# Patient Record
Sex: FEMALE | Race: WHITE | URBAN - METROPOLITAN AREA
[De-identification: names, ages, dates, MRNs, and addresses within clinical notes are randomized per-mention and may not be internally consistent; named-entity substitution may affect disease eponyms.]

---

## 2021-10-18 ENCOUNTER — HOSPITAL ENCOUNTER (OUTPATIENT)
Dept: PHYSICAL THERAPY | Age: 61
Setting detail: THERAPIES SERIES
Discharge: HOME | End: 2021-10-18
Payer: COMMERCIAL

## 2021-10-18 PROCEDURE — 97112 NEUROMUSCULAR REEDUCATION: CPT

## 2021-10-18 PROCEDURE — 97163 PT EVAL HIGH COMPLEX 45 MIN: CPT

## 2021-10-18 NOTE — PLAN OF CARE
[place], [facility], [address]  Phone: [phone]   Fax:     [phone]                                                       Physical Therapy Certification    Dear Referring Practitioner: Jayden Graham MD,    We had the pleasure of evaluating the following patient for physical therapy services at [address] [place] [address]. A summary of our findings can be found in the initial assessment below. This includes our plan of care. If you have any questions or concerns regarding these findings, please do not hesitate to contact me at the office phone number checked above. Thank you for the referral.       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   [] Yes, Patient intake triggered C-SSRS Screening      [] C-SSRS Screening completed  [] PCP notified via Epic    SUBJECTIVE: Patient has been dealing with significant Hx of neck and back pain. Pt states that her pain is sharp from her LEs to head. Pain is increased with cold, however the heat makes pain more tolerable.  She sleeps with heating hat on her head to reduce pain, however Pt staes that is feels as if se is being \"scalped\" due to the intensity of her pain. Unable to drive for >20 min before needing to pull over and stretch. Pt previously received PT at [facility] last year. Pt is willing to try anything to decrease her pain and does not want to lose her indep with her mobility. Pt goal of PT is be able to manage pain in order to remain indep. Fear avoidance: I should not do physical activities that (might) make my pain worse   [] True   [x] False     Relevant Medical History:HTN, previous spinal surgery x2, 2 thyroid srugeries  Functional Outcome:   NDI: raw score = 35; dysfunction = 70%  Oswestry: raw score = 35; dysfunction = 75%    Pain Scale: 9/10  Easing factors: muscle relaxor, heat   Provocative factors: walking, standing, sitting     Type: [x]Constant   []Intermittent  []Radiating []Localized []other:     Numbness/Tingling: BLE (L>R)    Occupation/School: disabled      Living Status/Prior Level of Function: Prior to this injury / incident, pt was independent with ADLs and IADLs, Pt rents room from older woman with 1 story with basement and 4 steps with HR to get in. OBJECTIVE:     Palpation: unable to fully palpate due to increase excruciating pain/hyperalgesic response with dermatome testing. Functional Mobility/Transfers: SPC, poor balance    Posture: FHP,     Gait: (include devices/WB status) Pt ambulated with SPC with unsteady gait, decreased stance on R LE with trunk lean to L with R LE swing. Pt required CGA for safety with SPC upon arrival. Trialed ambulation with FWW, which improved balance and decreased trunk lean with gait mechanics.      Dermatomes  Unable to complete full testing due to increased pain with testing Normal Abnormal Comments   Top of head (C1)  x Hyperalgesic response    Posterior occipital region (C2)  x Hyperalgesic response    Side of neck (C3)      Top of shoulder (C4)      Lateral deltoid (C5)      Tip of thumb (C6)      Distal middle finger (C7)      Distal fifth finger (C8)      Medial forearm (T1)      inguinal area (L1)       anterior mid-thigh (L2)      distal ant thigh/med knee (L3)      medial lower leg and foot (L4)      lateral lower leg and foot (L5)      posterior calf (S1)      medial calcaneus (S2)          Myotomes Normal Abnormal Comments   Neck flexion (C1-C2)      Neck sidebending (C3)      Shoulder elevation (C4)      Shoulder abduction (C5)      Elbow flexion/wrist extension (C6)      Elbow extension/wrist flexion (C7)      Thumb abduction (C8)      Finger abduction (T1)      Hip flexion (L1-L2)      Knee extension (L2-L4)      Dorsiflexion (L4-L5)      Great Toe Ext (L5)      Ankle Eversion (S1-S2)      Ankle PF(S1-S2)          ROM  Comments   Cervical Flexion     Cervical Extension          Lumbar Flexion     Lumbar Extension       ROM LEFT RIGHT Comments   Cervical Side Bend      Cervical Rotation      Shoulder Flex      Shoulder Abd      Shoulder ER      Shoulder IR            Lumbar Side Bend      Lumbar Rotation      Hip Flexion      Hip Abd      Hip ER      Hip IR      Hip Extension      Knee Ext      Knee Flex            Hamstring Flex      Piriformis                      Strength  Unable to apply pressure due to increased pain with physical touch LEFT RIGHT Comments   Hip Flexors 3 3    Hip Abductors 3 3    Hip Extensors 3 3    Hip Internal Rotators 3 3    Hip External Rotators 3 3     3 3    Knee flex 3 3    Knee ext 3 3    PF 3 3    DF 3- (able to lift to neutral) 3            Cervical Joint mobility: significantly limited due to pain and previous cervical surgery   []Normal    [x]Hypo   []Hyper      Neural dynamic tension testing Normal Abnormal Comments   ULTT            Slump Test  - Degree of knee flexion:  x  Increased symptoms with trunk fwd lean                           [x] Patient history, allergies, meds reviewed. Medical chart reviewed. See intake form.      Review Of Systems (ROS):  [x]Performed Review of systems (Integumentary, CardioPulmonary, Neurological) by intake and observation. Intake form has been scanned into medical record. Patient has been instructed to contact their primary care physician regarding ROS issues if not already being addressed at this time.       Co-morbidities/Complexities (which will affect course of rehabilitation):   []None        []Hx of COVID   Arthritic conditions   []Rheumatoid arthritis (M05.9)  []Osteoarthritis (M19.91)  []Gout   Cardiovascular conditions   [x]Hypertension (I10)  []Hyperlipidemia (E78.5)  []Angina pectoris (I20)  []Atherosclerosis (I70)  []Pacemaker  []Hx of CABG/stent/  cardiac surgeries   Musculoskeletal conditions   []Disc pathology   []Congenital spine pathologies   []Osteoporosis (M81.8)  []Osteopenia (M85.8)  []Scoliosis       Endocrine conditions   []Hypothyroid (E03.9)  []Hyperthyroid Gastrointestinal conditions   []Constipation (J63.26)   Metabolic conditions   []Morbid obesity (E66.01)  []Diabetes type 1(E10.65) or 2 (E11.65)   []Neuropathy (G60.9)     Cardio/Pulmonary conditions   []Asthma (J45)  []Coughing   []COPD (J44.9)  []CHF  []A-fib   Psychological Disorders  [x]Anxiety (F41.9)  [x]Depression (F32.9)   []Other:   Developmental Disorders  []Autism (F84.0)  []CP (G80)  []Down Syndrome (Q90.9)  []Developmental delay     Neurological conditions  []Prior Stroke (I69.30)  []Parkinson's (G20)  []Encephalopathy (G93.40)  []MS (G35)  []Post-polio (G14)  []SCI  []TBI  []ALS Other conditions  []Fibromyalgia (M79.7)  []Vertigo  []Syncope  []Kidney Failure  [x]Cancer      []currently undergoing                treatment  []Pregnancy  []Incontinence   Prior surgeries  []involved limb  [x]previous spinal surgery  [x] section birth  []hysterectomy  []bowel / bladder surgery  [x]other relevant surgeries (2 different neck surgeries)   []Other:              Barriers to/and or personal factors that will affect rehab potential: [x]Age  [x]Sex   []Smoker              []Motivation/Lack of Motivation                        [x]Co-Morbidities              []Cognitive Function, education/learning barriers              [x]Environmental, home barriers              []profession/work barriers  [x]past PT/medical experience  []other:  Justification: pt is significantly limited due to co-morbidities and previous experience with PT at another facility. Falls Risk Assessment (30 days):   [x] Falls Risk assessed and no intervention required. [] Falls Risk assessed and Patient requires intervention due to being higher risk   TUG score (>12s at risk):     [] Falls education provided, including         ASSESSMENT: Pt presented with increase pain in head, neck, shoulders, low back, and BLEs. She ambulate with SPC with very unsteady gait. Practiced using and instructed Pt to use FWW due to increased stability and safety with ambulation. Pt had to move in order to slightly alleviate pain while sitting through evaluation. When testing dermatomes, Pt expressed increased pain with light touch on head, and was unable to tolerate further testing on UEs and LEs. Pt presented with hyperalgesic response to light pressure, however when distracted Pt was able to tolerate light touch. Increased swelling on B LE noted. Discussed seeing PCP and pain management MD to further address increased pain and neurological s/s. Pt recommended for aquatic therapy to decrease pain and perform strengthening exercises.       Functional Impairments:     [x]Noted cervical/thoracic/lumbar/GHJ/proximal hip hypomobility   []Noted cervical/thoracic/lumbosacral and/or generalized hypermobility   []Decreased cervical/UE and/or lumbosacral/hip/LE functional ROM   []Noted Headache pain aggravated by neck movements with/without dizziness   [x]Abnormal reflexes/sensation/myotomal/dermatomal deficits   []Decreased DCF control or ability to hold head up   []Decreased core/proximal hip strength and neuromuscular control    []Decreased RC/scapular/core strength and neuromuscular control    [x]Decreased UE and/or LE functional strength  [x]Reduced balance/proprioceptive control    []other:      Functional Activity Limitations (from functional questionnaire and intake)   [x]Reduced ability to tolerate prolonged functional positions   [x]Reduced ability or difficulty with changes of positions or transfers between positions   [x]Reduced ability to maintain good posture and demonstrate good body mechanics with sitting, bending, and lifting   [x] Reduced ability or tolerance with driving and/or computer work   [x]Reduced ability to perform lifting, reaching, carrying tasks   [x]Reduced ability to concentrate   [x]Reduced ability to sleep    [x]Reduced ability to tolerate any impact through UE or spine   [x]Reduced ability to ambulate prolonged functional periods/distances/surfaces   [x]Reduced ability to squat   [x]Reduced ability to forward bend   [x]Reduced ability to ascend/descend stairs   []other:    Participation Restrictions   [x]Reduced participation in self care activities   [x]Reduced participation in home management activities   []Reduced participation in work activities   []Reduced participation in social activities. []Reduced participation in sport/recreational activities. Classification/Subgrouping:   []Signs/symptoms consistent with spinal instability/stabilization subgroup. []Signs/symptoms consistent with spinal mobilization/manipulation subgroup, myotomes and dermatomes intact. Meets manipulation criteria.     [x]signs/symptoms consistent with neck pain with mobility deficits     []signs/symptoms consistent with neck pain with movement coordinated impairments    [x]signs/symptoms consistent with neck pain with radiating pain    []signs/symptoms consistent with neck pain with headaches (cervicogenic)    [x]Signs/symptoms consistent with nerve root involvement including myotome & dermatome dysfunction   []sign/symptoms consistent with spinal facet dysfunction   []signs/symptoms consistent suggesting central cord compression/UMN syndromes   []Signs/symptoms consistent with Lumbar direction specific/centralization subgroup   []Signs/symptoms consistent with Cervical and/or Lumbar traction subgroup   []signs/symptoms consistent with discogenic cervical pain   []signs/symptoms consistent with rib dysfunction   []signs/symptoms consistent with postural dysfunction   []Signs/symptoms consistent with lumbar stenosis type dysfunction   []signs/symptoms consistent with shoulder pathology    []signs/symptoms consistent with post-surgical status including decreased ROM, strength and function.    []signs/symptoms consistent with pathology which may benefit from Dry Needling   []signs/symptoms which may limit the use of advanced manual therapy techniques: (Hypertension, recent trauma, intolerance to end range positions, prior TIA, visual issues, UE myotomes loss )     Prognosis/Rehab Potential:      []Excellent   []Good    [x]Fair   []Poor    Tolerance of evaluation/treatment:    []Excellent   []Good    [x]Fair   []Poor    Physical Therapy Evaluation Complexity Justification  [x] A history of present problem with:  [] no personal factors and/or comorbidities that impact the plan of care;  []1-2 personal factors and/or comorbidities that impact the plan of care  [x]3 personal factors and/or comorbidities that impact the plan of care  [x] An examination of body systems using standardized tests and measures addressing any of the following: body structures and functions (impairments), activity limitations, and/or participation restrictions;:  [] a total of 1-2 or more elements   [] a total of 3 or more elements   [x] a total of 4 or more elements   [x] A clinical presentation with:  [] stable and/or uncomplicated characteristics   [] evolving clinical presentation with changing characteristics  [x] unstable [] Met: [] Not Met: [] Adjusted  2. Patient will demonstrate increased [facility] cervical/thoracic spine and good LS mobility, good hip ROM to allow for proper joint functioning as indicated by patients Functional Deficits. [] Progressing: [] Met: [] Not Met: [] Adjusted  3. Patient will demonstrate an increase in postural awareness and control and activation of deep cervical stabilizers to allow for proper functional mobility as indicated by patients Functional Deficits. [] Progressing: [] Met: [] Not Met: [] Adjusted  4. Patient will demonstrate an increase in Strength to good proximal hip and core activation to allow for proper functional mobility as indicated by patients Functional Deficits. [] Progressing: [] Met: [] Not Met: [] Adjusted  5. Patient will return to functional activities including waling without increased symptoms or restriction. [] Progressing: [] Met: [] Not Met: [] Adjusted  6. Pt will safely ambulate with FWW in order to go grocery shopping.      [] Progressing: [] Met: [] Not Met: [] Adjusted     Electronically signed by:  Quentin Nevarez, PT, DPT

## 2021-10-18 NOTE — FLOWSHEET NOTE
Manual Intervention (17705)                                                       AquaticTherapy Dates of Service:   Aquatic Visits Exercises/Activities:   Transfers:  [] Stairs   [] Ramp  [] Chair Lift   % Immersion:            Ambulation/ Warm up:   UE Exercises: Forward   x Shoulder Shrugs  x    Lateral   x Shoulder Circles      Retro   x Scapular Retraction      [place]    Push Downs      Heel/Toe Walking    Punching       Rowing       Elbow Flex/Ext       Shldr Flex/Ext       [employer] aBd/aDd    LE Exercises:  Shldr Horiz aBd/aDd    HR/TR x Shldr IR/ER    Marches x Arm Circles    Squats  x PNF Diagonals    Hamstring Curls x Wall Push Ups    Hip Flexion (SLR) x     Hip aBduction (SLR) x     Hip Extension (SLR) x      Hip aDduction (SLR) x     Hip Circles  Functional:    Hip IR/Er  Step up forward x   Hip Hikes  Step up lateral       Step down        Lunges Forward      Lunges Retro      Lunges Lateral     Balance:        SLS        Tandem Stance x      NBOS eyes open x Seated:     NBOS eyes closed  Ankle pumps     Hand to Opposite Knee  Ankle Circles     Fwd Step ups to SLS  Knee Flex/Ext    Lateral Step ups to SLS  Hip aBd/aDd    Stop/Go Gait   Bicycle       Ankle DF/PF      Ankle Inv/Ev    Stretching:       Gastroc/Soleus      Hamstring  x Deep Water:    Knee Flex Stretch  Jog    Piriformis   Noodle Hang    Hip Flexor  Traction at [address]    SKTC      DKTC       ITB  Cool Down:    Quad  Fwd Walking    Mid Back   Lat Walking    UT  Retro Walking    Post Shoulder  Noodle float    Ladder Pull      Pec Stretch            Core: Other:    TrA set      Pelvic Tilts      Multifidi Walk outs c paddle      PNF Chop/Lifts                          Aquatic Abbreviation Key  B= Belt DB= Dumbells T= Theratube   H= Hydrotone N= Noodles W= Weights   P= Paddles S= Speedo equipment K= Kickboard      Pt.  Education: Gave pt tour of pool, explained how to use lockers, what to wear, that it would be in group setting, and showed pt aquatic equipment. Modalities:     Pt. Education:  -patient educated on diagnosis, prognosis and expectations for rehab  -all patient questions were answered    Home Exercise Program:  Discussed need for ambulation with FWW     Therapeutic Exercise and NMR EXR  [] (12717) Provided verbal/tactile cueing for activities related to strengthening, flexibility, endurance, ROM for improvements in  [] LE / Lumbar: LE, proximal hip, and core control with self care, mobility, lifting, ambulation. [] UE / Cervical: cervical, postural, scapular, scapulothoracic and UE control with self care, reaching, carrying, lifting, house/yardwork, driving, computer work.  [] (21158) Provided verbal/tactile cueing for activities related to improving balance, coordination, kinesthetic sense, posture, motor skill, proprioception to assist with   [] LE / lumbar: LE, proximal hip, and core control in self care, mobility, lifting, ambulation and eccentric single leg control. [] UE / cervical: cervical, scapular, scapulothoracic and UE control with self care, reaching, carrying, lifting, house/yardwork, driving, computer work.   [] (06920) Therapist is in constant attendance of 2 or more patients providing skilled therapy interventions, but not providing any significant amount of measurable one-on-one time to either patient, for improvements in  [] LE / lumbar: LE, proximal hip, and core control in self care, mobility, lifting, ambulation and eccentric single leg control. [] UE / cervical: cervical, scapular, scapulothoracic and UE control with self care, reaching, carrying, lifting, house/yardwork, driving, computer work.      NMR and Therapeutic Activities:    [] (92391 or 87232) Provided verbal/tactile cueing for activities related to improving balance, coordination, kinesthetic sense, posture, motor skill, proprioception and motor activation to allow for proper function of   [] LE: / Lumbar core, proximal hip and LE with self care and ADLs  [] UE / Cervical: cervical, postural, scapular, scapulothoracic and UE control with self care, carrying, lifting, driving, computer work.   [] (40142) Gait Re-education- Provided training and instruction to the patient for proper LE, core and proximal hip recruitment and positioning and eccentric body weight control with ambulation re-education including up and down stairs     Home Management Training / Self Care:  [] (88874) Provided self-care/home management training related to activities of daily living and compensatory training, and/or use of adaptive equipment for improvement with: ADLs and compensatory training, meal preparation, safety procedures and instruction in use of adaptive equipment, including bathing, grooming, dressing, personal hygiene, basic household cleaning and chores.      Home Exercise Program:    [] (63573) Reviewed/Progressed HEP activities related to strengthening, flexibility, endurance, ROM of   [] LE / Lumbar: core, proximal hip and LE for functional self-care, mobility, lifting and ambulation/stair navigation   [] UE / Cervical: cervical, postural, scapular, scapulothoracic and UE control with self care, reaching, carrying, lifting, house/yardwork, driving, computer work  [x] (79219)Reviewed/Progressed HEP activities related to improving balance, coordination, kinesthetic sense, posture, motor skill, proprioception of   [x] LE: core, proximal hip and LE for self care, mobility, lifting, and ambulation/stair navigation    [] UE / Cervical: cervical, postural,  scapular, scapulothoracic and UE control with self care, reaching, carrying, lifting, house/yardwork, driving, computer work    Manual Treatments:  PROM / STM / Oscillations-Mobs:  G-I, II, III, IV (PA's, Inf., Post.)  [] (75842) Provided manual therapy to mobilize LE, proximal hip and/or LS spine soft tissue/joints for the purpose of modulating pain, promoting relaxation,  increasing ROM, reducing/eliminating soft tissue demonstrate increased AROM to [facility] of cervical/thoracic spine and good LS mobility, good hip ROM to allow for proper joint functioning as indicated by patients Functional Deficits. [] Progressing: [] Met: [] Not Met: [] Adjusted  3. Patient will demonstrate an increase in postural awareness and control and activation of deep cervical stabilizers to allow for proper functional mobility as indicated by patients Functional Deficits. [] Progressing: [] Met: [] Not Met: [] Adjusted  4. Patient will demonstrate an increase in Strength to good proximal hip and core activation to allow for proper functional mobility as indicated by patients Functional Deficits. [] Progressing: [] Met: [] Not Met: [] Adjusted  5. Patient will return to functional activities including waling without increased symptoms or restriction. [] Progressing: [] Met: [] Not Met: [] Adjusted  6. Pt will safely ambulate with FWW in order to go grocery shopping. [] Progressing: [] Met: [] Not Met: [] Adjusted     Overall Progression Towards Functional goals/ Treatment Progress Update:  [] Patient is progressing as expected towards functional goals listed. [] Progression is slowed due to complexities/Impairments listed. [] Progression has been slowed due to co-morbidities.   [x] Plan just implemented, too soon to assess goals progression <30days   [] Goals require adjustment due to lack of progress  [] Patient is not progressing as expected and requires additional follow up with physician  [] Other    Persisting Functional Limitations/Impairments:  [x]Sleeping [x]Sitting               [x]Standing [x]Transfers        [x]Walking [x]Kneeling               [x]Stairs [x]Squatting / bending   [x]ADLs [x]Reaching  [x]Lifting  [x]Housework  [x]Driving []Job related tasks  []Sports/Recreation []Other:        ASSESSMENT:  See eval  Treatment/Activity Tolerance:  [] Patient able to complete tx [] Patient limited by fatigue  [x] Patient limited by pain  [] Patient limited by other medical complications  [] Other:     Prognosis: [] Good [x] Fair  [] Poor    Patient Requires Follow-up: [x] Yes  [] No    Plan for next treatment session:  2PLAN: See eval. PT 2x / week for 8 weeks. [] Continue per plan of care [] Alter current plan (see comments)  [x] Plan of care initiated [] Hold pending MD visit [] Discharge    Electronically signed by: Carolyn Davidson PT, DPT    Note: If patient does not return for scheduled/ recommended follow up visits, this note will serve as a discharge from care along with most recent update on progress.

## 2021-11-03 ENCOUNTER — APPOINTMENT (OUTPATIENT)
Dept: PHYSICAL THERAPY | Age: 61
End: 2021-11-03
Payer: COMMERCIAL

## 2021-11-05 ENCOUNTER — HOSPITAL ENCOUNTER (OUTPATIENT)
Dept: PHYSICAL THERAPY | Age: 61
Setting detail: THERAPIES SERIES
Discharge: HOME | End: 2021-11-05
Payer: COMMERCIAL

## 2021-11-05 PROCEDURE — 97113 AQUATIC THERAPY/EXERCISES: CPT

## 2021-11-05 PROCEDURE — 97150 GROUP THERAPEUTIC PROCEDURES: CPT

## 2021-11-05 NOTE — FLOWSHEET NOTE
Weights   P= Paddles S= Speedo equipment K= Kickboard      Pt. Education: Gave pt tour of pool, explained how to use lockers, what to wear, that it would be in group setting, and showed pt aquatic equipment. Modalities:     Pt. Education:  -patient educated on diagnosis, prognosis and expectations for rehab  -all patient questions were answered    Home Exercise Program:  Discussed need for ambulation with FWW     Therapeutic Exercise and NMR EXR  [] (86981) Provided verbal/tactile cueing for activities related to strengthening, flexibility, endurance, ROM for improvements in  [] LE / Lumbar: LE, proximal hip, and core control with self care, mobility, lifting, ambulation. [] UE / Cervical: cervical, postural, scapular, scapulothoracic and UE control with self care, reaching, carrying, lifting, house/yardwork, driving, computer work.  [] (08145) Provided verbal/tactile cueing for activities related to improving balance, coordination, kinesthetic sense, posture, motor skill, proprioception to assist with   [] LE / lumbar: LE, proximal hip, and core control in self care, mobility, lifting, ambulation and eccentric single leg control. [] UE / cervical: cervical, scapular, scapulothoracic and UE control with self care, reaching, carrying, lifting, house/yardwork, driving, computer work.   [] (75429) Therapist is in constant attendance of 2 or more patients providing skilled therapy interventions, but not providing any significant amount of measurable one-on-one time to either patient, for improvements in  [] LE / lumbar: LE, proximal hip, and core control in self care, mobility, lifting, ambulation and eccentric single leg control. [] UE / cervical: cervical, scapular, scapulothoracic and UE control with self care, reaching, carrying, lifting, house/yardwork, driving, computer work.      NMR and Therapeutic Activities:    [] (19899 or ) Provided verbal/tactile cueing for activities related to improving balance, coordination, kinesthetic sense, posture, motor skill, proprioception and motor activation to allow for proper function of   [] LE: / Lumbar core, proximal hip and LE with self care and ADLs  [] UE / Cervical: cervical, postural, scapular, scapulothoracic and UE control with self care, carrying, lifting, driving, computer work.   [] (01630) Gait Re-education- Provided training and instruction to the patient for proper LE, core and proximal hip recruitment and positioning and eccentric body weight control with ambulation re-education including up and down stairs     Home Management Training / Self Care:  [] (64766) Provided self-care/home management training related to activities of daily living and compensatory training, and/or use of adaptive equipment for improvement with: ADLs and compensatory training, meal preparation, safety procedures and instruction in use of adaptive equipment, including bathing, grooming, dressing, personal hygiene, basic household cleaning and chores.      Home Exercise Program:    [] (12058) Reviewed/Progressed HEP activities related to strengthening, flexibility, endurance, ROM of   [] LE / Lumbar: core, proximal hip and LE for functional self-care, mobility, lifting and ambulation/stair navigation   [] UE / Cervical: cervical, postural, scapular, scapulothoracic and UE control with self care, reaching, carrying, lifting, house/yardwork, driving, computer work  [x] (71903)Reviewed/Progressed HEP activities related to improving balance, coordination, kinesthetic sense, posture, motor skill, proprioception of   [x] LE: core, proximal hip and LE for self care, mobility, lifting, and ambulation/stair navigation    [] UE / Cervical: cervical, postural,  scapular, scapulothoracic and UE control with self care, reaching, carrying, lifting, house/yardwork, driving, computer work    Manual Treatments:  PROM / STM / Oscillations-Mobs:  G-I, II, III, IV (PA's, Inf., Post.)  [] (30020) Provided manual therapy to mobilize LE, proximal hip and/or LS spine soft tissue/joints for the purpose of modulating pain, promoting relaxation,  increasing ROM, reducing/eliminating soft tissue swelling/inflammation/restriction, improving soft tissue extensibility and allowing for proper ROM for normal function with   [] LE / lumbar: self care, mobility, lifting and ambulation. [] UE / Cervical: self care, reaching, carrying, lifting, house/yardwork, driving, computer work. Modalities:  [] (14085) Vasopneumatic compression: Utilized vasopneumatic compression to decrease edema / swelling for the purpose of improving mobility and quad tone / recruitment which will allow for increased overall function including but not limited to self-care, transfers, ambulation, and ascending / descending stairs. Charges:  Timed Code Treatment Minutes: 15   Total Treatment Minutes: 35     [] EVAL - LOW (52165)   [] EVAL - MOD (07528)  [] EVAL - HIGH (73570)  [] RE-EVAL (12770)  [] JR(75630) x       [] Ionto  [] NMR (54685) x 1       [] Vaso  [] Manual (89456) x       [] Ultrasound  [] TA x        [] Mech Traction (61512)  [x] Aquatic Therapy x   1  [] ES (un) (82312):   [] Home Management Training x  [] ES(attended) (06891)   [] Dry Needling 1-2 muscles (41095):  [] Dry Needling 3+ muscles (771026)  [x] Group:      [] Other:     GOALS:   Patient stated goal: Pt will be independent with knowing various positions to decrease her pain. [] Progressing: [] Met: [] Not Met: [] Adjusted    Therapist goals for Patient:   Short Term Goals: To be achieved in: 2 weeks  1. Independent in HEP and progression per patient tolerance, in order to prevent re-injury. [] Progressing: [] Met: [] Not Met: [] Adjusted  2. Patient will have a decrease in pain to facilitate improvement in movement, function, and ADLs as indicated by Functional Deficits. [] Progressing: [] Met: [] Not Met: [] Adjusted    Long Term Goals:  To be achieved in: 8 weeks  1. Disability index score of 65% or less for the NDI and/or KEITH to assist with reaching prior level of function. [] Progressing: [] Met: [] Not Met: [] Adjusted  2. Patient will demonstrate increased AROM to [facility] of cervical/thoracic spine and good LS mobility, good hip ROM to allow for proper joint functioning as indicated by patients Functional Deficits. [] Progressing: [] Met: [] Not Met: [] Adjusted  3. Patient will demonstrate an increase in postural awareness and control and activation of deep cervical stabilizers to allow for proper functional mobility as indicated by patients Functional Deficits. [] Progressing: [] Met: [] Not Met: [] Adjusted  4. Patient will demonstrate an increase in Strength to good proximal hip and core activation to allow for proper functional mobility as indicated by patients Functional Deficits. [] Progressing: [] Met: [] Not Met: [] Adjusted  5. Patient will return to functional activities including waling without increased symptoms or restriction. [] Progressing: [] Met: [] Not Met: [] Adjusted  6. Pt will safely ambulate with FWW in order to go grocery shopping. [] Progressing: [] Met: [] Not Met: [] Adjusted     Overall Progression Towards Functional goals/ Treatment Progress Update:  [] Patient is progressing as expected towards functional goals listed. [] Progression is slowed due to complexities/Impairments listed. [] Progression has been slowed due to co-morbidities.   [x] Plan just implemented, too soon to assess goals progression <30days   [] Goals require adjustment due to lack of progress  [] Patient is not progressing as expected and requires additional follow up with physician  [] Other    Persisting Functional Limitations/Impairments:  [x]Sleeping [x]Sitting               [x]Standing [x]Transfers        [x]Walking [x]Kneeling               [x]Stairs [x]Squatting / bending   [x]ADLs [x]Reaching  [x]Lifting  [x]Housework  [x]Driving []Job related tasks  []Sports/Recreation []Other:        ASSESSMENT:  See eval  Treatment/Activity Tolerance:  [] Patient able to complete tx [] Patient limited by fatigue  [x] Patient limited by pain  [] Patient limited by other medical complications  [] Other:     Prognosis: [] Good [x] Fair  [] Poor    Patient Requires Follow-up: [x] Yes  [] No    Plan for next treatment session:  2PLAN: See eval. PT 2x / week for 8 weeks. [x] Continue per plan of care [] Alter current plan (see comments)  [] Plan of care initiated [] Hold pending MD visit [] Discharge    Electronically signed by: Maria T Garcia, PT, DPT    Note: If patient does not return for scheduled/ recommended follow up visits, this note will serve as a discharge from care along with most recent update on progress.

## 2021-11-08 ENCOUNTER — HOSPITAL ENCOUNTER (OUTPATIENT)
Dept: PHYSICAL THERAPY | Age: 61
Setting detail: THERAPIES SERIES
Discharge: HOME | End: 2021-11-08
Payer: COMMERCIAL

## 2021-11-08 PROCEDURE — 97113 AQUATIC THERAPY/EXERCISES: CPT

## 2021-11-08 NOTE — FLOWSHEET NOTE
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  Treatment Diagnosis: CERVICAL & LUMBAR SPONDYLOSIS W MYOFASCIAL PAIN  Insurance/Certification information:  PT Insurance Information: [employer]  Physician Information:  Referring Practitioner: Lola Jordan MD  Plan of care signed (Y/N): []  Yes [x]  No     Date of Patient follow up with Physician: no current scheduled date     Progress Report: []  Yes  [x]  No     Date Range for reporting period:  Beginning: 10/18/21  Ending:     Progress report due (10 Rx/or 30 days whichever is less): visit #10 or 75/10/25 (date)     Recertification due (POC duration/ or 90 days whichever is less): visit #16 or 22 (date)     Visit # Insurance Allowable Auth required? Date Range   3/18 128 units  [x]  Yes  []  No 10/18/21-21             Latex Allergy:  [x]NO      []YES  Preferred Language for Healthcare:   [x]English       []other:    Functional Scale:        Date assessed:  NDI: raw score = 35; dysfunction = 70%   10/18/21  Oswestry: raw score = 35; dysfunction = 75%  10/18/21    Pain level:  8/10     SUBJECTIVE:  Pt is having pain all over, not as bad as some days but is still persistent.    OBJECTIVE:       RESTRICTIONS/PRECAUTIONS: HTN, hyperalgesic     Exercises/Interventions:      Resistance / level Sets/sec Reps Notes                                                                   Therapeutic Activities (09285)                                          Neuromuscular Re-ed (88018)       Therapeutic Exercises (15227)   Ambulation with FWW        100 ft x 3      Therapeutic rest breaks required   Pt education on importance of FWW, aquatic PT, and tour of aquatics  10 min Manual Intervention (97444)                                                       AquaticTherapy Dates of Service: 11/5, 11/8  Aquatic Visits Exercises/Activities:   Transfers:  [] Stairs   [] Ramp  [] Chair Lift   % Immersion:            Ambulation/ Warm up:   UE Exercises: Forward x1 Shoulder Shrugs  x    Lateral  x1 Shoulder Circles      Retro  x1 Scapular Retraction      [place]    Push Downs      Heel/Toe Walking    Punching       Rowing       Elbow Flex/Ext x10      Shldr Flex/Ext x10      Shldr aBd/aDd x10   LE Exercises:  Shldr Horiz aBd/aDd x10   HR/TR x10 Shldr IR/ER    Marches x10 Arm Circles x5cw/x5ccw   Squats  x10 PNF Diagonals    Hamstring Curls x10 Wall Push Ups    Hip Flexion (SLR) x10     Hip aBduction (SLR) x10     Hip Extension (SLR) x10      Hip aDduction (SLR)      Hip Circles x10 B each way  Functional:    Hip IR/Er  Step up forward x10 B   Hip Hikes  Step up lateral       Step down        Lunges Forward      Lunges Retro      Lunges Lateral     Balance:        SLS        Tandem Stance x10 sec x 4 B       NBOS eyes open 20 sec x 2  Seated:     NBOS eyes closed  Ankle pumps     Hand to Opposite Knee  Ankle Circles     Fwd Step ups to SLS  Knee Flex/Ext    Lateral Step ups to SLS  Hip aBd/aDd    Stop/Go Gait   Bicycle  x20     Ankle DF/PF x20     Ankle Inv/Ev x20   Stretching:       Gastroc/Soleus      Hamstring  x Deep Water:    Knee Flex Stretch  Jog    Piriformis   Noodle Hang   Hip Flexor  Traction at [facility]   SK     DKTC      ITB  Cool Down:   Quad  Fwd Walking   Mid Back   Lat Walking   UT  Retro Walking   Post Shoulder  Noodle float    Ladder Pull      Pec Stretch            Core: Other:    TrA set      Pelvic Tilts      Multifidi Walk outs c paddle      PNF Chop/Lifts                          Aquatic Abbreviation Key  B= Belt DB= Dumbells T= Theratube   H= Hydrotone N= Noodles W= Weights   P= Paddles S= Speedo equipment K= Kickboard      Pt. Education: Gave pt tour of pool, explained how to use lockers, what to wear, that it would be in group setting, and showed pt aquatic equipment. Modalities:     Pt. Education:  -patient educated on diagnosis, prognosis and expectations for rehab  -all patient questions were answered    Home Exercise Program:  Discussed need for ambulation with FWW     Therapeutic Exercise and NMR EXR  [] (91190) Provided verbal/tactile cueing for activities related to strengthening, flexibility, endurance, ROM for improvements in  [] LE / Lumbar: LE, proximal hip, and core control with self care, mobility, lifting, ambulation. [] UE / Cervical: cervical, postural, scapular, scapulothoracic and UE control with self care, reaching, carrying, lifting, house/yardwork, driving, computer work.  [] (89591) Provided verbal/tactile cueing for activities related to improving balance, coordination, kinesthetic sense, posture, motor skill, proprioception to assist with   [] LE / lumbar: LE, proximal hip, and core control in self care, mobility, lifting, ambulation and eccentric single leg control. [] UE / cervical: cervical, scapular, scapulothoracic and UE control with self care, reaching, carrying, lifting, house/yardwork, driving, computer work. [x] (98354) Aquatic therapy with therapeutic exercise. Provided verbal and tactile cueing for activities related to strengthening, flexibility, endurance, ROM for improvements in  [x] LE / lumbar: LE, proximal hip, and core control in self care, mobility, lifting, ambulation and eccentric single leg control.    [x] UE / cervical: cervical, scapular, scapulothoracic and UE control with self care, reaching, carrying, lifting, house/yardwork, driving, computer work.   [] (78381) Therapist is in constant attendance of 2 or more patients providing skilled therapy interventions, but not providing any significant amount of measurable one-on-one time to either patient, for improvements in  [] LE / lumbar: LE, proximal hip, and core control in self care, mobility, lifting, ambulation and eccentric single leg control. [] UE / cervical: cervical, scapular, scapulothoracic and UE control with self care, reaching, carrying, lifting, house/yardwork, driving, computer work. NMR and Therapeutic Activities:    [] (50478 or 12890) Provided verbal/tactile cueing for activities related to improving balance, coordination, kinesthetic sense, posture, motor skill, proprioception and motor activation to allow for proper function of   [] LE: / Lumbar core, proximal hip and LE with self care and ADLs  [] UE / Cervical: cervical, postural, scapular, scapulothoracic and UE control with self care, carrying, lifting, driving, computer work.   [] (00057) Gait Re-education- Provided training and instruction to the patient for proper LE, core and proximal hip recruitment and positioning and eccentric body weight control with ambulation re-education including up and down stairs     Home Management Training / Self Care:  [] (96337) Provided self-care/home management training related to activities of daily living and compensatory training, and/or use of adaptive equipment for improvement with: ADLs and compensatory training, meal preparation, safety procedures and instruction in use of adaptive equipment, including bathing, grooming, dressing, personal hygiene, basic household cleaning and chores.      Home Exercise Program:    [] (50050) Reviewed/Progressed HEP activities related to strengthening, flexibility, endurance, ROM of   [] LE / Lumbar: core, proximal hip and LE for functional self-care, mobility, lifting and ambulation/stair navigation   [] UE / Cervical: cervical, postural, scapular, scapulothoracic and UE control with self care, reaching, carrying, lifting, house/yardwork, driving, computer work  [] (35869)Reviewed/Progressed HEP activities related to improving balance, coordination, kinesthetic sense, posture, motor skill, proprioception of   [] LE: core, proximal hip and LE for self care, mobility, lifting, and ambulation/stair navigation    [] UE / Cervical: cervical, postural,  scapular, scapulothoracic and UE control with self care, reaching, carrying, lifting, house/yardwork, driving, computer work    Manual Treatments:  PROM / STM / Oscillations-Mobs:  G-I, II, III, IV (PA's, Inf., Post.)  [] (61350) Provided manual therapy to mobilize LE, proximal hip and/or LS spine soft tissue/joints for the purpose of modulating pain, promoting relaxation,  increasing ROM, reducing/eliminating soft tissue swelling/inflammation/restriction, improving soft tissue extensibility and allowing for proper ROM for normal function with   [] LE / lumbar: self care, mobility, lifting and ambulation. [] UE / Cervical: self care, reaching, carrying, lifting, house/yardwork, driving, computer work. Modalities:  [] (29425) Vasopneumatic compression: Utilized vasopneumatic compression to decrease edema / swelling for the purpose of improving mobility and quad tone / recruitment which will allow for increased overall function including but not limited to self-care, transfers, ambulation, and ascending / descending stairs. Units approved Units used Date Range   128 6 10/18/21-12/4/21       Charges:  Timed Code Treatment Minutes: 30   Total Treatment Minutes: 30     [] EVAL - LOW (03279)   [] EVAL - MOD (25861)  [] EVAL - HIGH (40957)  [] RE-EVAL (58088)  [] US(49639) x       [] Ionto  [] NMR (28085) x        [] Vaso  [] Manual (24889) x       [] Ultrasound  [] TA x        [] Mech Traction (46727)  [x] Aquatic Therapy x   2              [] ES (un) (57230):   [] Home Management Training x  [] ES(attended) (94500)   [] Dry Needling 1-2 muscles (33247):  [] Dry Needling 3+ muscles (759970)  [] Group:      [] Other:     GOALS:   Patient stated goal: Pt will be independent with knowing various positions to decrease her pain.    [] Progressing: [] Met: [] Not Met: [] Adjusted    Therapist goals for Patient:   Short Term Goals: To be achieved in: 2 weeks  1. Independent in HEP and progression per patient tolerance, in order to prevent re-injury. [] Progressing: [] Met: [] Not Met: [] Adjusted  2. Patient will have a decrease in pain to facilitate improvement in movement, function, and ADLs as indicated by Functional Deficits. [] Progressing: [] Met: [] Not Met: [] Adjusted    Long Term Goals: To be achieved in: 8 weeks  1. Disability index score of 65% or less for the NDI and/or KEITH to assist with reaching prior level of function. [] Progressing: [] Met: [] Not Met: [] Adjusted  2. Patient will demonstrate [facility]/thoracic spine and good LS mobility, good hip ROM to allow for proper joint functioning as indicated by patients Functional Deficits. [] Progressing: [] Met: [] Not Met: [] Adjusted  3. Patient will demonstrate an increase in postural awareness and control and activation of deep cervical stabilizers to allow for proper functional mobility as indicated by patients Functional Deficits. [] Progressing: [] Met: [] Not Met: [] Adjusted  4. Patient will demonstrate an increase in Strength to good proximal hip and core activation to allow for proper functional mobility as indicated by patients Functional Deficits. [] Progressing: [] Met: [] Not Met: [] Adjusted  5. Patient will return to functional activities including waling without increased symptoms or restriction. [] Progressing: [] Met: [] Not Met: [] Adjusted  6. Pt will safely ambulate with FWW in order to go grocery shopping. [] Progressing: [] Met: [] Not Met: [] Adjusted     Overall Progression Towards Functional goals/ Treatment Progress Update:  [] Patient is progressing as expected towards functional goals listed. [] Progression is slowed due to complexities/Impairments listed. [] Progression has been slowed due to co-morbidities.   [x] Plan just implemented, too soon to assess goals progression <30days   [] Goals require adjustment due to lack of progress  [] Patient is not progressing as expected and requires additional follow up with physician  [] Other    Persisting Functional Limitations/Impairments:  [x]Sleeping [x]Sitting               [x]Standing [x]Transfers        [x]Walking [x]Kneeling               [x]Stairs [x]Squatting / bending   [x]ADLs [x]Reaching  [x]Lifting  [x]Housework  [x]Driving []Job related tasks  []Sports/Recreation []Other:        ASSESSMENT:  Pt did well in the pool. Had one instance where R knee buckled while stepping up on box in the pool, but was able to easily recover. Will continue to progress strength, mobility and endurance in future pool sessions. Treatment/Activity Tolerance:  [] Patient able to complete tx [] Patient limited by fatigue  [x] Patient limited by pain  [] Patient limited by other medical complications  [] Other:     Prognosis: [] Good [x] Fair  [] Poor    Patient Requires Follow-up: [x] Yes  [] No    Plan for next treatment session:  Aquatic chart as above      PLAN: See eval. PT 2x / week for 8 weeks. [x] Continue per plan of care [] Alter current plan (see comments)  [] Plan of care initiated [] Hold pending MD visit [] Discharge    Electronically signed by: Bernice Vann PT, DPT    Note: If patient does not return for scheduled/ recommended follow up visits, this note will serve as a discharge from care along with most recent update on progress.

## 2021-11-11 ENCOUNTER — HOSPITAL ENCOUNTER (OUTPATIENT)
Dept: PHYSICAL THERAPY | Age: 61
Setting detail: THERAPIES SERIES
Discharge: HOME | End: 2021-11-11
Payer: COMMERCIAL
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
## 2021-11-15 ENCOUNTER — HOSPITAL ENCOUNTER (OUTPATIENT)
Dept: PHYSICAL THERAPY | Age: 61
Setting detail: THERAPIES SERIES
Discharge: HOME | End: 2021-11-15
Payer: COMMERCIAL

## 2021-11-15 PROCEDURE — 97113 AQUATIC THERAPY/EXERCISES: CPT

## 2021-11-15 NOTE — FLOWSHEET NOTE
[address] 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  Treatment Diagnosis: CERVICAL & LUMBAR SPONDYLOSIS W MYOFASCIAL PAIN  Insurance/Certification information:  PT Insurance Information: [employer]  Physician Information:  Referring Practitioner: Rodrick Marte MD  Plan of care signed (Y/N): []  Yes [x]  No     Date of Patient follow up with Physician: no current scheduled date     Progress Report: []  Yes  [x]  No     Date Range for reporting period:  Beginning: 10/18/21  Ending:     Progress report due (10 Rx/or 30 days whichever is less): visit #10 or  (date)     Recertification due (POC duration/ or 90 days whichever is less): visit #16 or 22 (date)     Visit # Insurance Allowable Auth required? Date Range    128 units  [x]  Yes  []  No 10/18/21-21             Latex Allergy:  [x]NO      []YES  Preferred Language for Healthcare:   [x]English       []other:    Functional Scale:        Date assessed:  NDI: raw score = 35; dysfunction = 70%   10/18/21  Oswestry: raw score = 35; dysfunction = 75%  10/18/21    Pain level:  8/10     SUBJECTIVE:  Pt has been rushing to get here so she's a little shaken up. Pain hasn't caught up with her yet.        OBJECTIVE:       RESTRICTIONS/PRECAUTIONS: HTN, hyperalgesic     Exercises/Interventions:      Resistance / level Sets/sec Reps Notes                                                                   Therapeutic Activities (15244)                                          Neuromuscular Re-ed (42826)       Therapeutic Exercises (25570)   Ambulation with FWW        100 ft x 3      Therapeutic rest breaks required   Pt education on importance of FWW, aquatic PT, and tour of aquatics  10 min                                   Manual Intervention ([id number] Dates of Service: 11/5, 11/8, 11/15  Aquatic Visits Exercises/Activities:   Transfers:  [] Stairs   [] Ramp  [] Chair Lift   % Immersion:            Ambulation/ Warm up:   UE Exercises: Forward x2 Shoulder Shrugs  x    Lateral  x1 Shoulder Circles      Retro  x Scapular Retraction   X10 seated on ledge   [place]    Push Downs      Heel/Toe Walking    Punching       Rowing X10 Back at wall      Elbow Flex/Ext x10 Back at wall      Shldr Flex/Ext x10      Shldr aBd/aDd [id number] at wall   LE Exercises:  Shldr Horiz aBd/aDd    HR/TR x10 Shldr IR/ER    Marches x10 Arm Circles    Squats  x10 PNF Diagonals    Hamstring Curls x10 Wall Push Ups    Hip Flexion (SLR) x10     Hip aBduction (SLR) x10     Hip Extension (SLR) x10      Hip aDduction (SLR)      Hip Circles  Functional:    Hip IR/Er  Step up forward held due to pain  Hip Hikes  Step up lateral       Step down        Lunges Forward      Lunges Retro      Lunges Lateral     Balance:        SLS        Tandem Stance      NBOS eyes open Seated:     NBOS eyes closed  Ankle pumps  X10   Hand to Opposite Knee  Ankle Circles  X10   Fwd Step ups to SLS  Knee Flex/Ext Partial ROM X10 L/R LAQ   Lateral Step ups to SLS  Hip aBd/aDd    Stop/Go Gait   Bicycle       Ankle DF/PF x20     Ankle Inv/Ev x20   Stretching:       Gastroc/Soleus      Hamstring  xseated on bench with slight knee extension 10\" X3 L/R Deep Water:    Knee Flex Stretch  Jog    Piriformis   Noodle Hang   Hip Flexor  Traction at [address]   SKTC     DKTC      ITB  Cool Down:   Quad  Fwd Walking   Mid Back   Lat Walking   UT  Retro Walking   Post Shoulder  Noodle float    Ladder Pull 10\" X2 and then had to stop due to back pain     Pec Stretch            Core:   Other:    TrA set      Pelvic Tilts      Multifidi Walk outs c paddle      PNF Chop/Lifts      Chin tucks to tolerance 5\" X8                   Aquatic Abbreviation Key  B= Belt DB= Dumbells T= Theratube   H= Hydrotone N= Noodles W= Weights   P= Paddles S= Speedo equipment K= Kickboard      Pt. Education: Gave pt tour of pool, explained how to use lockers, what to wear, that it would be in group setting, and showed pt aquatic equipment. Modalities:     Pt. Education:  -patient educated on diagnosis, prognosis and expectations for rehab  -all patient questions were answered    Home Exercise Program:  Discussed need for ambulation with FWW     Therapeutic Exercise and NMR EXR  [] (58268) Provided verbal/tactile cueing for activities related to strengthening, flexibility, endurance, ROM for improvements in  [] LE / Lumbar: LE, proximal hip, and core control with self care, mobility, lifting, ambulation. [] UE / Cervical: cervical, postural, scapular, scapulothoracic and UE control with self care, reaching, carrying, lifting, house/yardwork, driving, computer work.  [] (07961) Provided verbal/tactile cueing for activities related to improving balance, coordination, kinesthetic sense, posture, motor skill, proprioception to assist with   [] LE / lumbar: LE, proximal hip, and core control in self care, mobility, lifting, ambulation and eccentric single leg control. [] UE / cervical: cervical, scapular, scapulothoracic and UE control with self care, reaching, carrying, lifting, house/yardwork, driving, computer work. [x] (72850) Aquatic therapy with therapeutic exercise. Provided verbal and tactile cueing for activities related to strengthening, flexibility, endurance, ROM for improvements in  [x] LE / lumbar: LE, proximal hip, and core control in self care, mobility, lifting, ambulation and eccentric single leg control.    [x] UE / cervical: cervical, scapular, scapulothoracic and UE control with self care, reaching, carrying, lifting, house/yardwork, driving, computer work.   [] (39958) Therapist is in constant attendance of 2 or more patients providing skilled therapy interventions, but not providing any significant amount of measurable one-on-one time to either patient, for improvements in  [] LE / lumbar: LE, proximal hip, and core control in self care, mobility, lifting, ambulation and eccentric single leg control. [] UE / cervical: cervical, scapular, scapulothoracic and UE control with self care, reaching, carrying, lifting, house/yardwork, driving, computer work. NMR and Therapeutic Activities:    [] (16238 or 72042) Provided verbal/tactile cueing for activities related to improving balance, coordination, kinesthetic sense, posture, motor skill, proprioception and motor activation to allow for proper function of   [] LE: / Lumbar core, proximal hip and LE with self care and ADLs  [] UE / Cervical: cervical, postural, scapular, scapulothoracic and UE control with self care, carrying, lifting, driving, computer work.   [] (55894) Gait Re-education- Provided training and instruction to the patient for proper LE, core and proximal hip recruitment and positioning and eccentric body weight control with ambulation re-education including up and down stairs     Home Management Training / Self Care:  [] (81820) Provided self-care/home management training related to activities of daily living and compensatory training, and/or use of adaptive equipment for improvement with: ADLs and compensatory training, meal preparation, safety procedures and instruction in use of adaptive equipment, including bathing, grooming, dressing, personal hygiene, basic household cleaning and chores.      Home Exercise Program:    [] (83946) Reviewed/Progressed HEP activities related to strengthening, flexibility, endurance, ROM of   [] LE / Lumbar: core, proximal hip and LE for functional self-care, mobility, lifting and ambulation/stair navigation   [] UE / Cervical: cervical, postural, scapular, scapulothoracic and UE control with self care, reaching, carrying, lifting, house/yardwork, driving, computer work  [] (30987)Reviewed/Progressed HEP activities related to improving balance, coordination, kinesthetic sense, posture, motor skill, proprioception of   [] LE: core, proximal hip and LE for self care, mobility, lifting, and ambulation/stair navigation    [] UE / Cervical: cervical, postural,  scapular, scapulothoracic and UE control with self care, reaching, carrying, lifting, house/yardwork, driving, computer work    Manual Treatments:  PROM / STM / Oscillations-Mobs:  G-I, II, III, IV (PA's, Inf., Post.)  [] (75320) Provided manual therapy to mobilize LE, proximal hip and/or LS spine soft tissue/joints for the purpose of modulating pain, promoting relaxation,  increasing ROM, reducing/eliminating soft tissue swelling/inflammation/restriction, improving soft tissue extensibility and allowing for proper ROM for normal function with   [] LE / lumbar: self care, mobility, lifting and ambulation. [] UE / Cervical: self care, reaching, carrying, lifting, house/yardwork, driving, computer work. Modalities:  [] (49594) Vasopneumatic compression: Utilized vasopneumatic compression to decrease edema / swelling for the purpose of improving mobility and quad tone / recruitment which will allow for increased overall function including but not limited to self-care, transfers, ambulation, and ascending / descending stairs.        Units approved Units used Date Range   128 6 10/18/21-12/4/21       Charges:  Timed Code Treatment Minutes: 30   Total Treatment Minutes: 30     [] EVAL - LOW (55940)   [] EVAL - MOD (94776)  [] EVAL - HIGH (72842)  [] RE-EVAL (48821)  [] ML(10369) x       [] Ionto  [] NMR (66325) x        [] Vaso  [] Manual (38296) x       [] Ultrasound  [] TA x        [] Mech Traction (19772)  [x] Aquatic Therapy x   2              [] ES (un) (36908):   [] Home Management Training x  [] ES(attended) (34295)   [] Dry Needling 1-2 muscles (38887): [] Dry Needling 3+ muscles (179807)  [] Group:      [] Other:     GOALS:   Patient stated goal: Pt will be independent with knowing various positions to decrease her pain. [] Progressing: [] Met: [] Not Met: [] Adjusted    Therapist goals for Patient:   Short Term Goals: To be achieved in: 2 weeks  1. Independent in HEP and progression per patient tolerance, in order to prevent re-injury. [] Progressing: [] Met: [] Not Met: [] Adjusted  2. Patient will have a decrease in pain to facilitate improvement in movement, function, and ADLs as indicated by Functional Deficits. [] Progressing: [] Met: [] Not Met: [] Adjusted    Long Term Goals: To be achieved in: 8 weeks  1. Disability index score of 65% or less for the NDI and/or KEITH to assist with reaching prior level of function. [] Progressing: [] Met: [] Not Met: [] Adjusted  2. Patient will demonstrate increased AROM to [facility] of cervical/thoracic spine and good LS mobility, good hip ROM to allow for proper joint functioning as indicated by patients Functional Deficits. [] Progressing: [] Met: [] Not Met: [] Adjusted  3. Patient will demonstrate an increase in postural awareness and control and activation of deep cervical stabilizers to allow for proper functional mobility as indicated by patients Functional Deficits. [] Progressing: [] Met: [] Not Met: [] Adjusted  4. Patient will demonstrate an increase in Strength to good proximal hip and core activation to allow for proper functional mobility as indicated by patients Functional Deficits. [] Progressing: [] Met: [] Not Met: [] Adjusted  5. Patient will return to functional activities including waling without increased symptoms or restriction. [] Progressing: [] Met: [] Not Met: [] Adjusted  6. Pt will safely ambulate with FWW in order to go grocery shopping.      [] Progressing: [] Met: [] Not Met: [] Adjusted     Overall Progression Towards Functional goals/ Treatment Progress Update:  [] Patient is progressing as expected towards functional goals listed. [] Progression is slowed due to complexities/Impairments listed. [x] Progression has been slowed due to co-morbidities/previous spine surgeries in neck. [] Plan just implemented, too soon to assess goals progression <30days   [] Goals require adjustment due to lack of progress  [] Patient is not progressing as expected and requires additional follow up with physician  [] Other    Persisting Functional Limitations/Impairments:  [x]Sleeping [x]Sitting               [x]Standing [x]Transfers        [x]Walking [x]Kneeling               [x]Stairs [x]Squatting / bending   [x]ADLs [x]Reaching  [x]Lifting  [x]Housework  [x]Driving []Job related tasks  []Sports/Recreation []Other:        ASSESSMENT:  Pt. Was overwhelmed before she even started PT due to \"rushing around\"  but sat for a few minutes to calm herself. Patient did well walking in pool up to chest but once leg exercises started she started having a \"cramp \" in her L middle leg that then travelled to her upper back. PT modified exercises to decrease ROM and attempted several times to have patient rest.  Patient very tearful with inability to calm herself. PT asked her if she is on any pain meds and she stated no. PT also asked of she is seeing a pain MD and patient states the 2 that were refererrd by Cheryl this summer would not take her due to [employer]. Patient given 3-4 PMR MD and Neurologists to call for pain management this date. Treatment/Activity Tolerance:  [] Patient able to complete tx [] Patient limited by fatigue  [x] Patient limited by pain  [] Patient limited by other medical complications  [] Other:     Prognosis: [] Good [x] Fair  [] Poor    Patient Requires Follow-up: [x] Yes  [] No    Plan for next treatment session:  Aquatic chart as above      PLAN: See eval. PT 2x / week for 8 weeks.    [x] Continue per plan of care [] Alter current plan (see comments)  [] Plan of care initiated [] Hold pending MD visit [] Discharge    Electronically signed by: Kianna Jaimes PT/Yamilex Dhaliwal, [place] #[id number]    Note: If patient does not return for scheduled/ recommended follow up visits, this note will serve as a discharge from care along with most recent update on progress.

## 2021-11-18 ENCOUNTER — HOSPITAL ENCOUNTER (OUTPATIENT)
Dept: PHYSICAL THERAPY | Age: 61
Setting detail: THERAPIES SERIES
Discharge: HOME | End: 2021-11-18
Payer: COMMERCIAL

## 2021-11-18 PROCEDURE — 97113 AQUATIC THERAPY/EXERCISES: CPT

## 2021-11-18 PROCEDURE — 97150 GROUP THERAPEUTIC PROCEDURES: CPT

## 2021-11-18 NOTE — FLOWSHEET NOTE
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  Treatment Diagnosis: CERVICAL & LUMBAR SPONDYLOSIS W MYOFASCIAL PAIN  Insurance/Certification information:  PT [facility] Information[facility]  Physician Information:  Referring Practitioner: Ricarda Santiago MD  Plan of care signed (Y/N): []  Yes [x]  No     Date of Patient follow up with Physician: no current scheduled date     Progress Report: []  Yes  [x]  No     Date Range for reporting period:  Beginning: 10/18/21  Ending:     Progress report due (10 Rx/or 30 days whichever is less): visit #10 or  (date)     Recertification due (POC duration/ or 90 days whichever is less): visit #16 or 22 (date)     Visit # Insurance Allowable Auth required? Date Range    128 units  [x]  Yes  []  No 10/18/21-21             Latex Allergy:  [x]NO      []YES  Preferred Language for Healthcare:   [x]English       []other:    Functional Scale:        Date assessed:  NDI: raw score = 35; dysfunction = 70%   10/18/21  Oswestry: raw score = 35; dysfunction = 75%  10/18/21    Pain level:  8/10     SUBJECTIVE:  Pt reports bad pain after visit. Didn't think she did too much but will try to take it easy.        OBJECTIVE:       RESTRICTIONS/PRECAUTIONS: HTN, hyperalgesic     Exercises/Interventions:      Resistance / level Sets/sec Reps Notes                                                                   Therapeutic Activities (46811)                                          Neuromuscular Re-ed (34444)       Therapeutic Exercises (63594)   Ambulation with FWW        100 ft x 3      Therapeutic rest breaks required   Pt education on importance of FWW, aquatic PT, and tour of aquatics  10 min                                   Manual Intervention ([id number] Dates of Service: 11/5, 11/8, 11/15, 11/18,  Aquatic Visits Exercises/Activities:   Transfers:  [] Stairs   [] Ramp  [] Chair Lift   % Immersion:            Ambulation/ Warm up:   UE Exercises: Forward x2 Shoulder Shrugs  x    Lateral  x1 Shoulder Circles      Retro  x Scapular Retraction   X10 seated on ledge   Cariocas    Push Downs      Heel/Toe Walking    Punching       Rowing X10 Back at wall      Elbow Flex/Ext x10 Back at wall      Shldr Flex/Ext x10      Shldr aBd/aDd q63Bint at wall   LE Exercises:  Shldr Horiz aBd/aDd    HR/TR x10 Shldr IR/ER    Marches x10 Arm Circles    Squats  x10 PNF Diagonals    Hamstring Curls x10 Wall Push Ups    Hip Flexion (SLR) x10     Hip aBduction (SLR) x10     Hip Extension (SLR) x10      Hip aDduction (SLR)      Hip Circles  Functional:    Hip IR/Er  Step up forward held due to pain  Hip Hikes  Step up lateral       Step down        Lunges Forward      Lunges Retro      Lunges Lateral     Balance:        SLS        Tandem Stance      NBOS eyes open Seated:     NBOS eyes closed  Ankle pumps  X10   Hand to Opposite Knee  Ankle Circles  X10   Fwd Step ups to SLS  Knee Flex/Ext Partial ROM X10 L/R LAQ   Lateral Step ups to SLS  Hip aBd/aDd    Stop/Go Gait   Bicycle       Ankle DF/PF x20     Ankle Inv/Ev x20   Stretching:       Gastroc/Soleus      Hamstring  xseated on bench with slight knee extension 10\" X3 L/R [place]:    Knee Flex Stretch  Jog    Piriformis   Noodle Hang   Hip Flexor  Traction at [facility]      ITB  Cool Down:   Quad  Fwd Walking   Mid Back   Lat Walking   UT  Retro Walking   Post Shoulder  Noodle float    Ladder Pull 10\" X2 and then had to stop due to back pain     Pec Stretch            Core:   Other:    TrA set      Pelvic Tilts      Multifidi Walk outs c paddle      PNF Chop/Lifts Chin tucks to tolerance 5\" X8                   Aquatic Abbreviation Key  B= Belt DB= Dumbells T= Theratube   H= Hydrotone N= Noodles W= Weights   P= Paddles S= Speedo equipment K= Kickboard      Pt. Education: Gave pt tour of pool, explained how to use lockers, what to wear, that it would be in group setting, and showed pt aquatic equipment. Modalities:     Pt. Education:  -patient educated on diagnosis, prognosis and expectations for rehab  -all patient questions were answered    Home Exercise Program:  Discussed need for ambulation with FWW     Therapeutic Exercise and NMR EXR  [] (66952) Provided verbal/tactile cueing for activities related to strengthening, flexibility, endurance, ROM for improvements in  [] LE / Lumbar: LE, proximal hip, and core control with self care, mobility, lifting, ambulation. [] UE / Cervical: cervical, postural, scapular, scapulothoracic and UE control with self care, reaching, carrying, lifting, house/yardwork, driving, computer work.  [] (27497) Provided verbal/tactile cueing for activities related to improving balance, coordination, kinesthetic sense, posture, motor skill, proprioception to assist with   [] LE / lumbar: LE, proximal hip, and core control in self care, mobility, lifting, ambulation and eccentric single leg control. [] UE / cervical: cervical, scapular, scapulothoracic and UE control with self care, reaching, carrying, lifting, house/yardwork, driving, computer work. [x] (77125) Aquatic therapy with therapeutic exercise. Provided verbal and tactile cueing for activities related to strengthening, flexibility, endurance, ROM for improvements in  [x] LE / lumbar: LE, proximal hip, and core control in self care, mobility, lifting, ambulation and eccentric single leg control.    [x] UE / cervical: cervical, scapular, scapulothoracic and UE control with self care, reaching, carrying, lifting, house/yardwork, driving, computer work.   [] (15036) Therapist is in constant attendance of 2 or more patients providing skilled therapy interventions, but not providing any significant amount of measurable one-on-one time to either patient, for improvements in  [] LE / lumbar: LE, proximal hip, and core control in self care, mobility, lifting, ambulation and eccentric single leg control. [] UE / cervical: cervical, scapular, scapulothoracic and UE control with self care, reaching, carrying, lifting, house/yardwork, driving, computer work. NMR and Therapeutic Activities:    [] (42657 or 85995) Provided verbal/tactile cueing for activities related to improving balance, coordination, kinesthetic sense, posture, motor skill, proprioception and motor activation to allow for proper function of   [] LE: / Lumbar core, proximal hip and LE with self care and ADLs  [] UE / Cervical: cervical, postural, scapular, scapulothoracic and UE control with self care, carrying, lifting, driving, computer work.   [] (78753) Gait Re-education- Provided training and instruction to the patient for proper LE, core and proximal hip recruitment and positioning and eccentric body weight control with ambulation re-education including up and down stairs     Home Management Training / Self Care:  [] (64203) Provided self-care/home management training related to activities of daily living and compensatory training, and/or use of adaptive equipment for improvement with: ADLs and compensatory training, meal preparation, safety procedures and instruction in use of adaptive equipment, including bathing, grooming, dressing, personal hygiene, basic household cleaning and chores.      Home Exercise Program:    [] (07407) Reviewed/Progressed HEP activities related to strengthening, flexibility, endurance, ROM of   [] LE / Lumbar: core, proximal hip and LE for functional self-care, mobility, lifting and ambulation/stair navigation   [] UE / Cervical: cervical, postural, scapular, scapulothoracic and UE control with self care, reaching, carrying, lifting, house/yardwork, driving, computer work  [] (46261)Reviewed/Progressed HEP activities related to improving balance, coordination, kinesthetic sense, posture, motor skill, proprioception of   [] LE: core, proximal hip and LE for self care, mobility, lifting, and ambulation/stair navigation    [] UE / Cervical: cervical, postural,  scapular, scapulothoracic and UE control with self care, reaching, carrying, lifting, house/yardwork, driving, computer work    Manual Treatments:  PROM / STM / Oscillations-Mobs:  G-I, II, III, IV (PA's, Inf., Post.)  [] (19661) Provided manual therapy to mobilize LE, proximal hip and/or LS spine soft tissue/joints for the purpose of modulating pain, promoting relaxation,  increasing ROM, reducing/eliminating soft tissue swelling/inflammation/restriction, improving soft tissue extensibility and allowing for proper ROM for normal function with   [] LE / lumbar: self care, mobility, lifting and ambulation. [] UE / Cervical: self care, reaching, carrying, lifting, house/yardwork, driving, computer work. Modalities:  [] (61945) Vasopneumatic compression: Utilized vasopneumatic compression to decrease edema / swelling for the purpose of improving mobility and quad tone / recruitment which will allow for increased overall function including but not limited to self-care, transfers, ambulation, and ascending / descending stairs.        Units approved Units used Date Range   128 8 10/18/21-12/4/21       Charges:  Timed Code Treatment Minutes: 12   Total Treatment Minutes: 30     [] EVAL - LOW (28592)   [] EVAL - MOD (37926)  [] EVAL - HIGH (85175)  [] RE-EVAL (37648)  [] QF(91353) x       [] Ionto  [] NMR (16814) x        [] Vaso  [] Manual (17191) x       [] Ultrasound  [] TA x        [] Mech Traction (33365)  [x] Aquatic Therapy x   1              [] ES (un) (80758):   [] Home Management Training x  [] ES(attended) (47574)   [] Dry Needling 1-2 muscles Patient is progressing as expected towards functional goals listed. [] Progression is slowed due to complexities/Impairments listed. [x] Progression has been slowed due to co-morbidities/previous spine surgeries in neck. [] Plan just implemented, too soon to assess goals progression <30days   [] Goals require adjustment due to lack of progress  [] Patient is not progressing as expected and requires additional follow up with physician  [] Other    Persisting Functional Limitations/Impairments:  [x]Sleeping [x]Sitting               [x]Standing [x]Transfers        [x]Walking [x]Kneeling               [x]Stairs [x]Squatting / bending   [x]ADLs [x]Reaching  [x]Lifting  [x]Housework  [x]Driving []Job related tasks  []Sports/Recreation []Other:        ASSESSMENT:  Pt. Was overwhelmed before she even started PT due to \"rushing around\"  but sat for a few minutes to calm herself. Patient did well walking in pool up to chest but once leg exercises started she started having a \"cramp \" in her L middle leg that then travelled to her upper back. PT modified exercises to decrease ROM and attempted several times to have patient rest.  Patient very tearful with inability to calm herself. PT asked her if she is on any pain meds and she stated no. PT also asked of she is seeing a pain MD and patient states the 2 that were refererrd by Cheryl this summer would not take her due to [employer]. Patient given 3-4 PMR MD and Neurologists to call for pain management this date. Treatment/Activity Tolerance:  [] Patient able to complete tx [] Patient limited by fatigue  [x] Patient limited by pain  [] Patient limited by other medical complications  [] Other:     Prognosis: [] Good [x] Fair  [] Poor    Patient Requires Follow-up: [x] Yes  [] No    Plan for next treatment session:  Aquatic chart as above      PLAN: See eval. PT 2x / week for 8 weeks.    [x] Continue per plan of care [] Alter current plan (see comments)  [] Plan of care initiated [] Hold pending MD visit [] Discharge    Electronically signed by: Cristine Pham, PT/Yamilex Dhaliwal, [place] #[id number]    Note: If patient does not return for scheduled/ recommended follow up visits, this note will serve as a discharge from care along with most recent update on progress.

## 2021-11-23 ENCOUNTER — HOSPITAL ENCOUNTER (OUTPATIENT)
Dept: PHYSICAL THERAPY | Age: 61
Setting detail: THERAPIES SERIES
Discharge: HOME | End: 2021-11-23
Payer: COMMERCIAL

## 2021-11-23 PROCEDURE — 97150 GROUP THERAPEUTIC PROCEDURES: CPT

## 2021-11-23 PROCEDURE — 97113 AQUATIC THERAPY/EXERCISES: CPT

## 2021-11-23 NOTE — FLOWSHEET NOTE
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 Date Range    128 units  [x]  Yes  []  No 10/18/21-21             Latex Allergy:  [x]NO      []YES  Preferred Language for Healthcare:   [x]English       []other:    Functional Scale:        Date assessed:  NDI: raw score = 35; dysfunction = 70%   10/18/21  Oswestry: raw score = 35; dysfunction = 75%  10/18/21    Pain level:  8/10     SUBJECTIVE:     Pain levels are elevated today, has been moving around her home more, trying to prepare for Thanksgiving day meal and family coming over. Plans to stick to only 10 reps with each exercise since she's already to tired. Was okay following last session but later that day, everything returned at once.         OBJECTIVE:       RESTRICTIONS/PRECAUTIONS: HTN, hyperalgesic     Exercises/Interventions:      Resistance / level Sets/sec Reps Notes                                                                   Therapeutic Activities (34481) Neuromuscular Re-ed (19373)       Therapeutic Exercises (95387)   Ambulation with FWW        100 ft x 3      Therapeutic rest breaks required   Pt education on importance of FWW, aquatic PT, and tour of aquatics  10 min                                   Manual Intervention (77235)                                                       AquaticTherapy Dates of Service: 11/5, 11/8, 11/15, 11/18, 11/23  Aquatic Visits Exercises/Activities:   Transfers:  [] Stairs   [] Ramp  [] Chair Lift   % Immersion:            Ambulation/ Warm up:   UE Exercises:       Forward x2 Shoulder Shrugs  x    Lateral  x1 Shoulder Circles      Retro  x Scapular Retraction   X10 seated on ledge   [place]ariocas    Push Downs      Heel/Toe Walking    Punching       Rowing X10 Back at wall      Elbow Flex/Ext x10 Back at wall      Shldr Flex/Ext x10      Shldr aBd/aDd a16[place] at wall   LE Exercises:  Shldr Horiz aBd/aDd    HR/TR x10 Shldr IR/ER    Marches x10 Arm Circles    Squats  x10 PNF Diagonals    Hamstring Curls x10 Wall Push Ups    Hip Flexion (SLR) x10     Hip aBduction (SLR) x10     Hip Extension (SLR) x10      Hip aDduction (SLR)      Hip Circles x10 B each way  Functional:    Hip IR/Er  Step up forward held due to pain  Hip Hikes  Step up lateral       Step down        Lunges Forward      Lunges Retro      Lunges Lateral     Balance:        SLS        Tandem Stance x10 sec x 4 B       NBOS eyes open 20 sec x 2  :  Standing 11/23     NBOS eyes closed  Ankle pumps  X10   Hand to Opposite Knee  Ankle Circles  X10   Fwd Step ups to SLS  Knee Flex/Ext Partial ROM X10 L/R LAQ   Lateral Step ups to SLS  Hip aBd/aDd    Stop/Go Gait   Bicycle       Ankle DF/PF x20     Ankle Inv/Ev x20   Stretching:       Gastroc/Soleus      Hamstring  On ladder 30\" X3 L/R [place]:    Knee Flex Stretch  Jog    Piriformis   Noodle Hang   Hip Flexor  Traction at [facility]     DKTC      ITB  Cool Down:   Quad  Fwd Walking   Mid Back   Lat Walking   UT Retro Walking   Post Shoulder  Noodle float    Ladder Pull      Pec Stretch            Core: Other:    TrA set      Pelvic Tilts      Multifidi Walk outs c paddle      PNF Chop/Lifts      Chin tucks to tolerance x10                   Aquatic Abbreviation Key  B= Belt DB= Dumbells T= Theratube   H= Hydrotone N= Noodles W= Weights   P= Paddles S= Speedo equipment K= Kickboard      Pt. Education: Gave pt tour of pool, explained how to use lockers, what to wear, that it would be in group setting, and showed pt aquatic equipment. Modalities:     Pt. Education:  -patient educated on diagnosis, prognosis and expectations for rehab  -all patient questions were answered    Home Exercise Program:  Discussed need for ambulation with FWW     Therapeutic Exercise and NMR EXR  [] (48712) Provided verbal/tactile cueing for activities related to strengthening, flexibility, endurance, ROM for improvements in  [] LE / Lumbar: LE, proximal hip, and core control with self care, mobility, lifting, ambulation. [] UE / Cervical: cervical, postural, scapular, scapulothoracic and UE control with self care, reaching, carrying, lifting, house/yardwork, driving, computer work.  [] (77249) Provided verbal/tactile cueing for activities related to improving balance, coordination, kinesthetic sense, posture, motor skill, proprioception to assist with   [] LE / lumbar: LE, proximal hip, and core control in self care, mobility, lifting, ambulation and eccentric single leg control. [] UE / cervical: cervical, scapular, scapulothoracic and UE control with self care, reaching, carrying, lifting, house/yardwork, driving, computer work. [x] (16133) Aquatic therapy with therapeutic exercise. Provided verbal and tactile cueing for activities related to strengthening, flexibility, endurance, ROM for improvements in  [x] LE / lumbar: LE, proximal hip, and core control in self care, mobility, lifting, ambulation and eccentric single leg control. core, proximal hip and LE for functional self-care, mobility, lifting and ambulation/stair navigation   [] UE / Cervical: cervical, postural, scapular, scapulothoracic and UE control with self care, reaching, carrying, lifting, house/yardwork, driving, computer work  [] (09207)Reviewed/Progressed HEP activities related to improving balance, coordination, kinesthetic sense, posture, motor skill, proprioception of   [] LE: core, proximal hip and LE for self care, mobility, lifting, and ambulation/stair navigation    [] UE / Cervical: cervical, postural,  scapular, scapulothoracic and UE control with self care, reaching, carrying, lifting, house/yardwork, driving, computer work    Manual Treatments:  PROM / STM / Oscillations-Mobs:  G-I, II, III, IV (PA's, Inf., Post.)  [] (50247) Provided manual therapy to mobilize LE, proximal hip and/or LS spine soft tissue/joints for the purpose of modulating pain, promoting relaxation,  increasing ROM, reducing/eliminating soft tissue swelling/inflammation/restriction, improving soft tissue extensibility and allowing for proper ROM for normal function with   [] LE / lumbar: self care, mobility, lifting and ambulation. [] UE / Cervical: self care, reaching, carrying, lifting, house/yardwork, driving, computer work. Modalities:  [] (90350) Vasopneumatic compression: Utilized vasopneumatic compression to decrease edema / swelling for the purpose of improving mobility and quad tone / recruitment which will allow for increased overall function including but not limited to self-care, transfers, ambulation, and ascending / descending stairs.        Units approved Units used Date Range   128 11 10/18/21-12/4/21       Charges:  Timed Code Treatment Minutes: 30   Total Treatment Minutes: 52     [] EVAL - LOW (92710)   [] EVAL - MOD (60708)  [] EVAL - HIGH (23699)  [] RE-EVAL (08892)  [] QU(60477) x       [] Ionto  [] NMR (00719) x        [] Vaso  [] Manual (46239) x       [] Ultrasound  [] TA x        [] [facility] Traction (41255)  [x] Aquatic Therapy x   2              [] ES (un) (17778):   [] Home Management Training x  [] ES(attended) (75602)   [] Dry Needling 1-2 muscles (97794):  [] Dry Needling 3+ muscles (114457)  [x] Group: 1     [] Other:     GOALS:   Patient stated goal: Pt will be independent with knowing various positions to decrease her pain. [] Progressing: [] Met: [] Not Met: [] Adjusted    Therapist goals for Patient:   Short Term Goals: To be achieved in: 2 weeks  1. Independent in HEP and progression per patient tolerance, in order to prevent re-injury. [] Progressing: [] Met: [] Not Met: [] Adjusted  2. Patient will have a decrease in pain to facilitate improvement in movement, function, and ADLs as indicated by Functional Deficits. [] Progressing: [] Met: [] Not Met: [] Adjusted    Long Term Goals: To be achieved in: 8 weeks  1. Disability index score of 65% or less for the NDI and/or KEITH to assist with reaching prior level of function. [] Progressing: [] Met: [] Not Met: [] Adjusted  2. Patient will demonstrate increased AROM to [facility] of cervical/thoracic spine and good LS mobility, good hip ROM to allow for proper joint functioning as indicated by patients Functional Deficits. [] Progressing: [] Met: [] Not Met: [] Adjusted  3. Patient will demonstrate an increase in postural awareness and control and activation of deep cervical stabilizers to allow for proper functional mobility as indicated by patients Functional Deficits. [] Progressing: [] Met: [] Not Met: [] Adjusted  4. Patient will demonstrate an increase in Strength to good proximal hip and core activation to allow for proper functional mobility as indicated by patients Functional Deficits. [] Progressing: [] Met: [] Not Met: [] Adjusted  5. Patient will return to functional activities including waling without increased symptoms or restriction. [] Progressing: [] Met: [] Not Met: [] Adjusted  6.  Pt will safely ambulate with FWW in order to go grocery shopping. [] Progressing: [] Met: [] Not Met: [] Adjusted     Overall Progression Towards Functional goals/ Treatment Progress Update:  [] Patient is progressing as expected towards functional goals listed. [] Progression is slowed due to complexities/Impairments listed. [x] Progression has been slowed due to co-morbidities/previous spine surgeries in neck. [] Plan just implemented, too soon to assess goals progression <30days   [] Goals require adjustment due to lack of progress  [] Patient is not progressing as expected and requires additional follow up with physician  [] Other    Persisting Functional Limitations/Impairments:  [x]Sleeping [x]Sitting               [x]Standing [x]Transfers        [x]Walking [x]Kneeling               [x]Stairs [x]Squatting / bending   [x]ADLs [x]Reaching  [x]Lifting  [x]Housework  [x]Driving []Job related tasks  []Sports/Recreation []Other:        ASSESSMENT:      Increased difficulty performing chin tucks, compensates with platysmas. Tolerated ex's well, expects to have increased fatigue & soreness later on though. Treatment/Activity Tolerance:  [] Patient able to complete tx [] Patient limited by fatigue  [x] Patient limited by pain  [] Patient limited by other medical complications  [] Other:     Prognosis: [] Good [x] Fair  [] Poor    Patient Requires Follow-up: [x] Yes  [] No    Plan for next treatment session:  Aquatic chart as above      PLAN: See jasmyne. PT 2x / week for 8 weeks. [x] Continue per plan of care [] Alter current plan (see comments)  [] Plan of care initiated [] Hold pending MD visit [] Discharge    Electronically signed by: Renell Alpers, PT    Note: If patient does not return for scheduled/ recommended follow up visits, this note will serve as a discharge from care along with most recent update on progress.

## 2021-11-25 ENCOUNTER — APPOINTMENT (OUTPATIENT)
Dept: PHYSICAL THERAPY | Age: 61
End: 2021-11-25
Payer: COMMERCIAL

## 2021-11-26 ENCOUNTER — HOSPITAL ENCOUNTER (OUTPATIENT)
Dept: PHYSICAL THERAPY | Age: 61
Setting detail: THERAPIES SERIES
Discharge: HOME | End: 2021-11-26
Payer: COMMERCIAL
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
## 2021-11-30 ENCOUNTER — HOSPITAL ENCOUNTER (OUTPATIENT)
Dept: PHYSICAL THERAPY | Age: 61
Setting detail: THERAPIES SERIES
Discharge: HOME | End: 2021-11-30
Payer: COMMERCIAL
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
## 2022-05-11 ENCOUNTER — HOSPITAL ENCOUNTER (OUTPATIENT)
Dept: PHYSICAL THERAPY | Age: 62
Setting detail: THERAPIES SERIES
Discharge: HOME | End: 2022-05-11
Payer: COMMERCIAL

## 2022-05-11 NOTE — FLOWSHEET NOTE
Physical Therapy  Cancellation/No-show Note  Patient Name:  Michael Muñoz  :  1960   Date:  2022  Cancelled visits to date: 0  No-shows to date: 1    Patient status for today's appointment patient:  []  Cancelled  []  Rescheduled appointment  [x]  No-show -  (eval)     Reason given by patient:  []  Patient ill  []  Conflicting appointment  []  No transportation    []  Conflict with work  [x]  No reason given  []  Other:     Comments:      Phone call information:   []  Phone call made today to patient at _ time at number provided:      []  Patient answered, conversation as follows:    []  Patient did not answer, message left as follows:  [x]  Phone call not made today  []  Phone call not needed - pt contacted us to cancel and provided reason for cancellation.      Electronically signed by:  Rivas Torres PT

## 2022-05-20 ENCOUNTER — HOSPITAL ENCOUNTER (OUTPATIENT)
Dept: PHYSICAL THERAPY | Age: 62
Setting detail: THERAPIES SERIES
Discharge: HOME | End: 2022-05-20
Payer: COMMERCIAL

## 2022-05-20 PROCEDURE — 97162 PT EVAL MOD COMPLEX 30 MIN: CPT

## 2022-05-20 PROCEDURE — 97530 THERAPEUTIC ACTIVITIES: CPT
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
## 2022-05-20 NOTE — FLOWSHEET NOTE
[address] [facility]  Phone: [phone]   Fax: [phone]    Physical Therapy Treatment Note/ Progress Report:     Date:  2022    Patient Name:  Yogesh Beckham    :  1960  MRN: [id number]  Restrictions/Precautions:    Medical/Treatment Diagnosis Information:  · Diagnosis: Low Back Pain  · Treatment Diagnosis: Back pain - cervical, thoracic and lumbar  Insurance/Certification information:  PT Insurance Information: Jose Brown  Physician Information:   Dr. Singleton Barley of care signed (Y/N): []  Yes [x]  No    Date of Patient follow up with Physician: 22     Progress Report: []  Yes  [x]  No     Date Range for reporting period:  Beginnin22  Ending:     Progress report due (10 Rx/or 30 days whichever is less): visit #10 or   (date)     Recertification due (POC duration/ or 90 days whichever is less): visit #8 or 22 (date)     Visit # Insurance Allowable Auth required?  Date Range     [x]  Yes  []  No        Units approved Units used Date Range          Latex Allergy:  [x]NO      []YES  Preferred Language for Healthcare:   [x]English       []other:    Functional Scale:           Date assessed:  TO physical FS primary measure score = 22; risk adjusted = 38  22    Pain level:  10+/10     SUBJECTIVE:  See eval    OBJECTIVE: See eval   Observation:    Test measurements:      RESTRICTIONS/PRECAUTIONS:     Treatment based classification:    [] mobilization/manipulation   [] stabilization   [] extension based   [] flexion based   [] lateral shift   [] traction   [] unspecified Components:   [x] thoracolumbar   [x] pelvic   [] SIJ   [] sacral   [] hip         Exercises/Interventions:     Therapeutic Exercise (73294) Resistance / level Sets / Seconds Reps Notes / Cues                                                                  Therapeutic Activities (99101)                                          Neuromuscular Re-ed (39528) Manual Intervention (19963)       Prone PA       GISTM/STM       Lumbar Manip       SI Manip       Hip belt mobs       Hip LA distraction                                AquaticTherapy Dates of Service:   Aquatic Visits Exercises/Activities:   Transfers:  [] Stairs   [] Ramp  [] Chair Lift   % Immersion:            Ambulation/ Warm up:   UE Exercises: Forward   x Shoulder Shrugs      Lateral   x Shoulder Circles      Retro   x Scapular Retraction      [place]    Push Downs      Heel/Toe Walking    Punching       Rowing       Elbow Flex/Ext       Shldr Flex/Ext       [employer] aBd/aDd    LE Exercises:  Shldr Horiz aBd/aDd    HR/TR x Shldr IR/ER    Marches x Arm Circles    Squats  x PNF Diagonals    Hamstring Curls x Wall Push Ups    Hip Flexion (SLR) x     Hip aBduction (SLR) x     Hip Extension (SLR) x      Hip aDduction (SLR)      Hip Circles x Functional:    Hip IR/Er  Step up forward    Hip Hikes  Step up lateral       Step down        Lunges Forward      Lunges Retro      Lunges Lateral     Balance:        SLS        Tandem Stance       NBOS eyes open  Seated:     NBOS eyes closed  Ankle pumps     Hand to Opposite Knee  Ankle Circles     Fwd Step ups to SLS  Knee Flex/Ext    Lateral Step ups to SLS  Hip aBd/aDd    Stop/Go Gait   Bicycle       Ankle DF/PF      Ankle Inv/Ev    Stretching:       Gastroc/Soleus x     Hamstring  x Deep Water:    Knee Flex Stretch  Jog    Piriformis  x Noodle Hang    Hip Flexor x Traction at Wall    SKTC x     DKTC       ITB  Cool Down:    Quad  Fwd Walking    Mid Back   Lat Walking    UT  Retro Walking    Post Shoulder  Noodle float    Ladder Pull      Pec Stretch            Core: Other:    TrA set x     Pelvic Tilts x     Multifidi Walk outs c paddle      PNF Chop/Lifts                          Aquatic Abbreviation Key  B= Belt DB= Dumbells T= Theratube   H= Hydrotone N= Noodles W= Weights   P= Paddles S= Speedo equipment K= Kickboard      Pt.  Education: 5/20/22: Gave pt tour of pool, explained how to use lockers, what to wear, that it would be in group setting, and showed pt aquatic equipment. Modalities:   5/20/22: hot blanket wrapped around patient at end of session - pt very tearful and in significant amounts of pain    Pt. Education:  5/20/22:  -pt educated on diagnosis, prognosis and expectations for rehab  -all pt questions were answered    Home Exercise Program:  None given this date    Therapeutic Exercise and NMR EXR  [] (09003) Provided verbal/tactile cueing for activities related to strengthening, flexibility, endurance, ROM  for improvements in proximal hip and core control with self care, mobility, lifting and ambulation.  [] (23129) Provided verbal/tactile cueing for activities related to improving balance, coordination, kinesthetic sense, posture, motor skill, proprioception  to assist with core control in self care, mobility, lifting, and ambulation.   [] (40005) Therapist is in constant attendance of 2 or more patients providing skilled therapy interventions, but not providing any significant amount of measurable one-on-one time to either patient, for improvements in LE, proximal hip, and core control in self care, mobility, lifting, ambulation and eccentric single leg control.      Therapeutic Activities:    [] (30749 or 50564) Provided verbal/tactile cueing for activities related to improving balance, coordination, kinesthetic sense, posture, motor skill, proprioception and motor activation to allow for proper function  with self care and ADLs  [x] (92973) Provided training and instruction to the patient for proper core and proximal hip recruitment and positioning with ambulation re-education     Home Exercise Program:    [x] (88786) Reviewed/Progressed HEP activities related to strengthening, flexibility, endurance, ROM of core, proximal hip and LE for functional self-care, mobility, lifting and ambulation   [] (69253) Reviewed/Progressed HEP activities related to improving balance, coordination, kinesthetic sense, posture, motor skill, proprioception of core, proximal hip and LE for self care, mobility, lifting, and ambulation      Manual Treatments:  PROM / STM / Oscillations-Mobs:  G-I, II, III, IV (Phil, Inf., Post.)  [] (46814) Provided manual therapy to mobilize proximal hip and LS spine soft tissue/joints for the purpose of modulating pain, promoting relaxation,  increasing ROM, reducing/eliminating soft tissue swelling/inflammation/restriction, improving soft tissue extensibility and allowing for proper ROM for normal function with self care, mobility, lifting and ambulation. Charges:  Timed Code Treatment Minutes: 15   Total Treatment Minutes: 45       [] EVAL - LOW (39734)   [x] EVAL - MOD (10898)  [] EVAL - HIGH (98658)  [] RE-EVAL (59164)  [] DA(98217) x      [] Ionto  [] NMR (71090) x      [] Vaso  [] Manual (14008) x      [] Ultrasound  [x] TA x 1      [] Mech Traction (26581)  [] GaitTraining x     [] ES (un) (40390):   [] Home Management Training [] ES(attended) (86797)             [] Aquatics    [] Group  [] Other    Goals:   Patient stated goal: relieve pain  []? Progressing: []? Met: []? Not Met: []? Adjusted     Therapist goals for Patient:   Short Term Goals: To be achieved in: 2 weeks  1. Independent in HEP and progression per patient tolerance, in order to prevent re-injury. []? Progressing: []? Met: []? Not Met: []? Adjusted  2. Patient will have a decrease in pain to facilitate improvement in movement, function, and ADLs as indicated by ability to drive to therapy without increased pain  []? Progressing: []? Met: []? Not Met: []? Adjusted     Long Term Goals: To be achieved in: 4 weeks  1. FOTO score of at least 36 to assist with reaching prior level of function. []? Progressing: []? Met: []? Not Met: []? Adjusted  2.  Patient will demonstrate increased AROM to WNL, good LS mobility, good hip ROM to allow pt to pick items up off floor   []? Progressing: []? Met: []? Not Met: []? Adjusted  3. Patient will demonstrate an increase in Strength to good proximal hip and core activation to allow for proper functional mobility as indicated by patients ability to walk >10 minutes without increased symptoms/restrictions  []? Progressing: []? Met: []? Not Met: []? Adjusted  2. Patient will return to functional activities including standing to cook for 10 minutes without increased symptoms or restriction. []? Progressing: []? Met: []? Not Met: []? Adjusted    Overall Progression Towards Functional goals/ Treatment Progress Update:  [] Patient is progressing as expected towards functional goals listed. [] Progression is slowed due to complexities/Impairments listed. [] Progression has been slowed due to co-morbidities. [x] Plan just implemented, too soon to assess goals progression <30days   [] Goals require adjustment due to lack of progress  [] Patient is not progressing as expected and requires additional follow up with physician  [] Other    Persisting Functional Limitations/Impairments:  [x]Sitting [x]Standing   [x]Walking [x]Squatting/bending    [x]Stairs [x]ADL's    [x]Transfers [x]Reaching  [x]Housework []Job related tasks  [x]Driving []Sports/Recreation   [x]Sleeping []Other:    ASSESSMENT:  See eval  Treatment/Activity Tolerance:  [] Patient able to complete tx  [] Patient limited by fatique  [x] Patient limited by pain  [] Patient limited by other medical complications  [] Other:     Prognosis: [] Good [x] Fair  [] Poor    Patient Requires Follow-up: [x] Yes  [] No    PLAN: See jasmyne. PT 2 x / week for 4 weeks.    [] Continue per plan of care [] Alter current plan (see comments)  [x] Plan of care initiated [] Hold pending MD visit [] Discharge    Electronically signed by: Ivan Addison, PT PT, DPT OCS [id number]      Note: If patient does not return for scheduled/ recommended follow up visits, this note will serve as

## 2024-07-09 ENCOUNTER — HOSPITAL ENCOUNTER (EMERGENCY)
Age: 64
Discharge: HOME | End: 2024-07-09
Attending: EMERGENCY MEDICINE
Payer: COMMERCIAL

## 2024-07-09 ENCOUNTER — APPOINTMENT (OUTPATIENT)
Age: 64
End: 2024-07-09
Payer: COMMERCIAL

## 2024-07-09 VITALS
SYSTOLIC BLOOD PRESSURE: 173 MMHG | HEART RATE: 87 BPM | WEIGHT: 245.3 LBS | TEMPERATURE: 98.5 F | RESPIRATION RATE: 16 BRPM | BODY MASS INDEX: 41.46 KG/M2 | OXYGEN SATURATION: 98 % | DIASTOLIC BLOOD PRESSURE: 88 MMHG

## 2024-07-09 DIAGNOSIS — R51.9 NONINTRACTABLE EPISODIC HEADACHE, UNSPECIFIED HEADACHE TYPE: Primary | ICD-10-CM

## 2024-07-09 LAB
ALBUMIN SERPL-MCNC: 3.9 G/DL (ref 3.4–5)
ALBUMIN/GLOB SERPL: 1.1 {RATIO}
ALP SERPL-CCNC: 88 U/L (ref 40–129)
ALT SERPL-CCNC: 20 U/L (ref 10–40)
ANION GAP SERPL CALCULATED.3IONS-SCNC: 15 MMOL/L (ref 3–16)
AST SERPL-CCNC: 22 U/L (ref 15–37)
BACTERIA URNS QL MICRO: ABNORMAL
BASOPHILS # BLD: 0.04 K/UL (ref 0–0.2)
BASOPHILS NFR BLD: 1 %
BILIRUB SERPL-MCNC: <0.2 MG/DL (ref 0–1)
BILIRUB UR QL STRIP: NEGATIVE
BUN SERPL-MCNC: 16 MG/DL (ref 7–20)
CALCIUM SERPL-MCNC: 8.6 MG/DL (ref 8.3–10.6)
CHARACTER UR: ABNORMAL
CHLORIDE SERPL-SCNC: 106 MMOL/L (ref 99–110)
CLARITY UR: CLEAR
CO2 SERPL-SCNC: 21 MMOL/L (ref 21–32)
COLOR UR: YELLOW
CREAT SERPL-MCNC: 0.8 MG/DL (ref 0.6–1.2)
CRP SERPL HS-MCNC: 3.2 MG/L (ref 0–5.1)
EOSINOPHIL # BLD: 0.1 K/UL (ref 0–0.6)
EOSINOPHILS RELATIVE PERCENT: 1 %
EPI CELLS #/AREA URNS HPF: ABNORMAL /HPF
ERYTHROCYTE [DISTWIDTH] IN BLOOD BY AUTOMATED COUNT: 15 % (ref 12.4–15.4)
ERYTHROCYTE [SEDIMENTATION RATE] IN BLOOD BY WESTERGREN METHOD: 15 MM/HR (ref 0–30)
GFR, ESTIMATED: 78 ML/MIN/1.73M2
GLUCOSE BLD-MCNC: 117 MG/DL (ref 70–99)
GLUCOSE SERPL-MCNC: 135 MG/DL (ref 70–99)
GLUCOSE UR STRIP-MCNC: NEGATIVE MG/DL
HCT VFR BLD AUTO: 41.1 % (ref 36–48)
HGB BLD-MCNC: 13.7 G/DL (ref 12–16)
HGB UR QL STRIP.AUTO: ABNORMAL
IMM GRANULOCYTES # BLD AUTO: 0.1 K/UL (ref 0–0.5)
IMM GRANULOCYTES NFR BLD: 1 %
KETONES UR STRIP-MCNC: ABNORMAL MG/DL
LEUKOCYTE ESTERASE UR QL STRIP: NEGATIVE
LIPASE SERPL-CCNC: 14 U/L (ref 13–60)
LYMPHOCYTES NFR BLD: 1.26 K/UL (ref 1–5.1)
LYMPHOCYTES RELATIVE PERCENT: 15 %
MAGNESIUM SERPL-MCNC: 2.2 MG/DL (ref 1.8–2.4)
MCH RBC QN AUTO: 28.1 PG (ref 26–34)
MCHC RBC AUTO-ENTMCNC: 33.3 G/DL (ref 31–36)
MCV RBC AUTO: 84.2 FL (ref 80–100)
MONOCYTES NFR BLD: 0.43 K/UL (ref 0–1.3)
MONOCYTES NFR BLD: 5 %
NEUTROPHILS NFR BLD: 78 %
NEUTS SEG NFR BLD: 6.68 K/UL (ref 1.7–7.7)
NITRITE UR QL STRIP: NEGATIVE
PH UR STRIP: 5.5 [PH] (ref 5–8)
PLATELET # BLD AUTO: 392 K/UL (ref 135–450)
PMV BLD AUTO: 9.6 FL
POTASSIUM SERPL-SCNC: 4.1 MMOL/L (ref 3.5–5.1)
PROT SERPL-MCNC: 7.2 G/DL (ref 6.4–8.2)
PROT UR STRIP-MCNC: 100 MG/DL
RBC # BLD AUTO: 4.88 M/UL (ref 4–5.2)
RBC #/AREA URNS HPF: ABNORMAL /HPF
SARS-COV-2 RDRP RESP QL NAA+PROBE: NOT DETECTED
SODIUM SERPL-SCNC: 141 MMOL/L (ref 136–145)
SP GR UR STRIP: >1.03 (ref 1–1.03)
SPECIMEN DESCRIPTION: NORMAL
UROBILINOGEN UR STRIP-ACNC: 0.2 EU/DL (ref 0–1)
WBC #/AREA URNS HPF: ABNORMAL /HPF
WBC OTHER # BLD: 8.6 K/UL (ref 4–11)
YEAST URNS QL MICRO: PRESENT

## 2024-07-09 PROCEDURE — 99284 EMERGENCY DEPT VISIT MOD MDM: CPT

## 2024-07-09 PROCEDURE — 6360000002 HC RX W HCPCS: Performed by: EMERGENCY MEDICINE

## 2024-07-09 PROCEDURE — 83690 ASSAY OF LIPASE: CPT

## 2024-07-09 PROCEDURE — 81001 URINALYSIS AUTO W/SCOPE: CPT

## 2024-07-09 PROCEDURE — 82962 GLUCOSE BLOOD TEST: CPT

## 2024-07-09 PROCEDURE — 70450 CT HEAD/BRAIN W/O DYE: CPT

## 2024-07-09 PROCEDURE — 85652 RBC SED RATE AUTOMATED: CPT

## 2024-07-09 PROCEDURE — 96375 TX/PRO/DX INJ NEW DRUG ADDON: CPT

## 2024-07-09 PROCEDURE — 80053 COMPREHEN METABOLIC PANEL: CPT

## 2024-07-09 PROCEDURE — 85025 COMPLETE CBC W/AUTO DIFF WBC: CPT

## 2024-07-09 PROCEDURE — 83735 ASSAY OF MAGNESIUM: CPT

## 2024-07-09 PROCEDURE — 93005 ELECTROCARDIOGRAM TRACING: CPT | Performed by: EMERGENCY MEDICINE

## 2024-07-09 PROCEDURE — 87635 SARS-COV-2 COVID-19 AMP PRB: CPT

## 2024-07-09 PROCEDURE — 96374 THER/PROPH/DIAG INJ IV PUSH: CPT

## 2024-07-09 PROCEDURE — 86140 C-REACTIVE PROTEIN: CPT

## 2024-07-09 RX ORDER — CETIRIZINE HYDROCHLORIDE 10 MG/1
10 TABLET ORAL DAILY
COMMUNITY

## 2024-07-09 RX ORDER — DIPHENHYDRAMINE HYDROCHLORIDE 50 MG/ML
12.5 INJECTION INTRAMUSCULAR; INTRAVENOUS ONCE
Status: COMPLETED | OUTPATIENT
Start: 2024-07-09 | End: 2024-07-09

## 2024-07-09 RX ORDER — BUSPIRONE HYDROCHLORIDE 10 MG/1
10 TABLET ORAL 3 TIMES DAILY
COMMUNITY

## 2024-07-09 RX ORDER — CARVEDILOL 6.25 MG/1
6.25 TABLET ORAL 2 TIMES DAILY WITH MEALS
COMMUNITY

## 2024-07-09 RX ORDER — MORPHINE SULFATE 2 MG/ML
2 INJECTION, SOLUTION INTRAMUSCULAR; INTRAVENOUS ONCE
Status: COMPLETED | OUTPATIENT
Start: 2024-07-09 | End: 2024-07-09

## 2024-07-09 RX ORDER — PROCHLORPERAZINE EDISYLATE 5 MG/ML
10 INJECTION INTRAMUSCULAR; INTRAVENOUS ONCE
Status: COMPLETED | OUTPATIENT
Start: 2024-07-09 | End: 2024-07-09

## 2024-07-09 RX ORDER — ONDANSETRON 2 MG/ML
4 INJECTION INTRAMUSCULAR; INTRAVENOUS ONCE
Status: COMPLETED | OUTPATIENT
Start: 2024-07-09 | End: 2024-07-09

## 2024-07-09 RX ORDER — NAPROXEN 500 MG/1
500 TABLET ORAL 2 TIMES DAILY WITH MEALS
COMMUNITY

## 2024-07-09 RX ORDER — TAMOXIFEN CITRATE 10 MG/1
20 TABLET ORAL DAILY
COMMUNITY

## 2024-07-09 RX ORDER — PREGABALIN 150 MG/1
150 CAPSULE ORAL 2 TIMES DAILY
COMMUNITY

## 2024-07-09 RX ORDER — BUMETANIDE 0.5 MG/1
0.5 TABLET ORAL DAILY
COMMUNITY

## 2024-07-09 RX ORDER — DULOXETIN HYDROCHLORIDE 30 MG/1
30 CAPSULE, DELAYED RELEASE ORAL
COMMUNITY

## 2024-07-09 RX ADMIN — MORPHINE SULFATE 2 MG: 2 INJECTION, SOLUTION INTRAMUSCULAR; INTRAVENOUS at 03:45

## 2024-07-09 RX ADMIN — PROCHLORPERAZINE EDISYLATE 10 MG: 5 INJECTION INTRAMUSCULAR; INTRAVENOUS at 03:47

## 2024-07-09 RX ADMIN — ONDANSETRON 4 MG: 2 INJECTION INTRAMUSCULAR; INTRAVENOUS at 02:39

## 2024-07-09 RX ADMIN — DIPHENHYDRAMINE HYDROCHLORIDE 12.5 MG: 50 INJECTION, SOLUTION INTRAMUSCULAR; INTRAVENOUS at 03:43

## 2024-07-09 ASSESSMENT — PAIN - FUNCTIONAL ASSESSMENT
PAIN_FUNCTIONAL_ASSESSMENT: NONE - DENIES PAIN
PAIN_FUNCTIONAL_ASSESSMENT: 0-10

## 2024-07-09 ASSESSMENT — PAIN DESCRIPTION - LOCATION: LOCATION: NECK

## 2024-07-09 ASSESSMENT — PAIN SCALES - GENERAL
PAINLEVEL_OUTOF10: 9
PAINLEVEL_OUTOF10: 2

## 2024-07-09 NOTE — DISCHARGE INSTRUCTIONS
Return to emergency department if worsening recurrent headache, fever, vomiting, worse in any way or any problems.    Fluids.  Rest.  Continue home medications.    Follow-up with your neurologist in 2 to 3 days and with family doctor within the next week.

## 2024-07-09 NOTE — DISCHARGE INSTR - COC
Continuity of Care Form    Patient Name: Charis Padron   :  1960  MRN:  [id number]    Admit date:  2024  Discharge date:  ***    Code Status Order: No Order   Advance Directives:     Admitting Physician:  No admitting provider for patient encounter.  PCP: Nj Zayas MD    Discharging Nurse: ***  Discharging Hospital Unit/Room#:   Discharging Unit Phone Number: ***    Emergency Contact:   Extended Emergency Contact Information  Primary Emergency Contact: Anshul Gamble  Home Phone: [phone]  Relation: Child  Secondary Emergency Contact: No,None  Relation: Other    Past Surgical History:  Past Surgical History:   Procedure Laterality Date     SECTION      NECK SURGERY      THYROIDECTOMY      TUBAL LIGATION         Immunization History:   Immunization History   Administered Date(s) Administered    COVID-19, MODERNA BLUE border, Primary or Immunocompromised, (age 12y+), IM, 100 mcg/0.5mL 2021, 2021       Active Problems:  There is no problem list on file for this patient.      Isolation/Infection:   Isolation            No Isolation          Patient Infection Status       None to display                     Nurse Assessment:  Last Vital Signs: BP (!) 173/88   Pulse 87   Temp 98.5 °F (36.9 °C) (Oral)   Resp 16   Wt 111.3 kg (245 lb 4.8 oz)   SpO2 98%   BMI 41.46 kg/m²     Last documented pain score (0-10 scale): Pain Level: 2  Last Weight:   Wt Readings from Last 1 Encounters:   24 111.3 kg (245 lb 4.8 oz)     Mental Status:  {IP PT MENTAL STATUS:88108}    IV Access:  { YIMI IV ACCESS:083364709}    Nursing Mobility/ADLs:  Walking   {CHP DME ADLs:396158351}  Transfer  {CHP DME ADLs:498063532}  Bathing  {CHP DME ADLs:464404449}  Dressing  {CHP DME ADLs:809905632}  Toileting  {CHP DME ADLs:462532526}  Feeding  {CHP DME ADLs:113790180}  Med Admin  {CHP DME ADLs:083793790}  Med Delivery   { YIMI MED Delivery:565356232}    Wound Care Documentation and Therapy:

## 2024-07-09 NOTE — ED PROVIDER NOTES
HEAD WO CONTRAST   Final Result      No acute intracranial abnormality.      Electronically signed by Josh Casillas MD               EKG: Normal sinus rhythm at 95 bpm.  Normal SD and QT interval.  Nonspecific ST-T wave changes noted.  No acute ST-T wave changes noted.    RHYTHM STRIP:nsr    PROCEDURES   Unless otherwise noted below, none     CRITICAL CARE TIME -none        Vitals:    Vitals:    07/09/24 0300 07/09/24 0315 07/09/24 0326 07/09/24 0331   BP: (!) 141/90   (!) 158/94   Pulse:  79 81 78   Resp:  21 18 17   Temp:       TempSrc:       SpO2: 96% 98% 96% 97%   Weight:              Exclusion criteria - the patient is NOT to be included for SEP-1 Core Measure due to:  2+ SIRS criteria are not met            CC/HPI Summary, DDx, ED Course, and Reassessment: 63-year-old female presents emergency department with complaints of headache vomiting and history as noted above.  Patient received Compazine Benadryl and 2 mg of morphine following which she was pain-free and rated pain a 0.  Review of old records reveals extensive evaluation by patient's neurosurgeon Dr. Jostin Mendoza 6/2024.  Patient underwent cervical fusion on 6/19/2024 by Dr. Mendoza.  She experienced postoperative encephalopathy following which she had MRI of the brain with and without contrast MRI angio head and neck without contrast and MRI venogram of head without contrast all of which revealed no acute abnormality.    Patient rested comfortably during ED stay.  Symptoms resolved following the above-stated treatment.  Diagnostic evaluation in the emergency department is unremarkable.  Low suspicion for TIA CVA meningitis encephalitis intracranial hemorrhage giant cell arteritis.  Patient felt to be safe for discharge home.  Return precautions discussed at length and she verbalized understanding.  Emphasis on close outpatient follow-up.       Patient was given the following medications:   Medications   ondansetron (ZOFRAN) injection 4 mg (4 mg

## 2024-07-10 LAB
EKG ATRIAL RATE: 95 BPM
EKG DIAGNOSIS: NORMAL
EKG P AXIS: 48 DEGREES
EKG P-R INTERVAL: 152 MS
EKG Q-T INTERVAL: 372 MS
EKG QRS DURATION: 116 MS
EKG QTC CALCULATION (BAZETT): 468 MS
EKG R AXIS: -66 DEGREES
EKG T AXIS: 58 DEGREES
EKG VENTRICULAR RATE: 95 BPM